# Patient Record
Sex: FEMALE | Race: BLACK OR AFRICAN AMERICAN | NOT HISPANIC OR LATINO | Employment: OTHER | URBAN - METROPOLITAN AREA
[De-identification: names, ages, dates, MRNs, and addresses within clinical notes are randomized per-mention and may not be internally consistent; named-entity substitution may affect disease eponyms.]

---

## 2017-01-12 ENCOUNTER — HOSPITAL ENCOUNTER (OUTPATIENT)
Dept: RADIOLOGY | Facility: HOSPITAL | Age: 82
Discharge: HOME | End: 2017-01-12
Attending: FAMILY MEDICINE
Payer: MEDICARE

## 2017-01-12 DIAGNOSIS — E04.1 NONTOXIC UNINODULAR GOITER: ICD-10-CM

## 2017-01-12 DIAGNOSIS — Z95.828 HISTORY OF REPAIR OF ANEURYSM OF ABDOMINAL AORTA USING ENDOVASCULAR STENT GRAFT: ICD-10-CM

## 2017-01-12 PROCEDURE — 76775 US EXAM ABDO BACK WALL LIM: CPT | Mod: TC

## 2017-01-12 PROCEDURE — 76775 US EXAM ABDO BACK WALL LIM: CPT | Mod: 26,,, | Performed by: RADIOLOGY

## 2017-01-12 PROCEDURE — 76536 US EXAM OF HEAD AND NECK: CPT | Mod: 26,,, | Performed by: RADIOLOGY

## 2017-01-12 PROCEDURE — 76536 US EXAM OF HEAD AND NECK: CPT | Mod: TC

## 2017-01-31 PROBLEM — R06.02 SHORTNESS OF BREATH: Status: ACTIVE | Noted: 2017-01-31

## 2017-01-31 PROBLEM — I35.1 AORTIC VALVE INSUFFICIENCY: Status: ACTIVE | Noted: 2017-01-31

## 2017-01-31 PROBLEM — I11.9 HYPERTENSIVE HEART DISEASE WITHOUT HEART FAILURE: Status: ACTIVE | Noted: 2017-01-31

## 2017-01-31 PROBLEM — I10 ESSENTIAL HYPERTENSION: Status: ACTIVE | Noted: 2017-01-31

## 2017-05-05 PROBLEM — I35.1 AORTIC VALVE INSUFFICIENCY: Status: RESOLVED | Noted: 2017-01-31 | Resolved: 2017-05-05

## 2017-05-16 ENCOUNTER — HOSPITAL ENCOUNTER (OUTPATIENT)
Dept: RADIOLOGY | Facility: HOSPITAL | Age: 82
Discharge: HOME | End: 2017-05-16
Attending: INTERNAL MEDICINE
Payer: MEDICARE

## 2017-05-16 DIAGNOSIS — I71.019 DISSECTION OF THORACIC AORTA: ICD-10-CM

## 2017-05-16 PROCEDURE — 71275 CT ANGIOGRAPHY CHEST: CPT | Mod: TC

## 2017-05-16 PROCEDURE — 74175 CTA ABDOMEN W/CONTRAST: CPT | Mod: 26,,, | Performed by: RADIOLOGY

## 2017-05-16 PROCEDURE — 74175 CTA ABDOMEN W/CONTRAST: CPT | Mod: TC

## 2017-05-16 PROCEDURE — 71275 CT ANGIOGRAPHY CHEST: CPT | Mod: 26,,, | Performed by: RADIOLOGY

## 2017-05-16 PROCEDURE — 25500020 PHARM REV CODE 255: Performed by: INTERNAL MEDICINE

## 2017-05-16 RX ADMIN — IOHEXOL 100 ML: 350 INJECTION, SOLUTION INTRAVENOUS at 02:05

## 2017-11-06 ENCOUNTER — OFFICE VISIT (OUTPATIENT)
Dept: CARDIOLOGY | Facility: CLINIC | Age: 82
End: 2017-11-06
Payer: MEDICARE

## 2017-11-06 VITALS
HEART RATE: 57 BPM | HEIGHT: 62 IN | WEIGHT: 182.5 LBS | DIASTOLIC BLOOD PRESSURE: 76 MMHG | BODY MASS INDEX: 33.58 KG/M2 | SYSTOLIC BLOOD PRESSURE: 110 MMHG

## 2017-11-06 DIAGNOSIS — Z86.79 HX OF REPAIR OF DISSECTING THORACIC ANEURYSM: Primary | ICD-10-CM

## 2017-11-06 DIAGNOSIS — I71.10 RUPTURED ANEURYSM OF THORACIC AORTA: ICD-10-CM

## 2017-11-06 DIAGNOSIS — I71.03: ICD-10-CM

## 2017-11-06 DIAGNOSIS — I10 HYPERTENSION COMPLICATIONS: ICD-10-CM

## 2017-11-06 DIAGNOSIS — I51.7 LVH (LEFT VENTRICULAR HYPERTROPHY): ICD-10-CM

## 2017-11-06 DIAGNOSIS — I10 ESSENTIAL HYPERTENSION: ICD-10-CM

## 2017-11-06 DIAGNOSIS — Z98.890 HX OF REPAIR OF DISSECTING THORACIC ANEURYSM: Primary | ICD-10-CM

## 2017-11-06 DIAGNOSIS — I11.9 HYPERTENSIVE HEART DISEASE WITHOUT HEART FAILURE: ICD-10-CM

## 2017-11-06 DIAGNOSIS — I71.019 DISSECTION OF THORACIC AORTA: ICD-10-CM

## 2017-11-06 PROCEDURE — 93000 ELECTROCARDIOGRAM COMPLETE: CPT | Mod: S$GLB,,, | Performed by: INTERNAL MEDICINE

## 2017-11-06 PROCEDURE — 99999 PR PBB SHADOW E&M-EST. PATIENT-LVL II: CPT | Mod: PBBFAC,,, | Performed by: INTERNAL MEDICINE

## 2017-11-06 PROCEDURE — 99213 OFFICE O/P EST LOW 20 MIN: CPT | Mod: S$GLB,,, | Performed by: INTERNAL MEDICINE

## 2017-11-06 RX ORDER — NIFEDIPINE 60 MG/1
60 TABLET, EXTENDED RELEASE ORAL DAILY
Qty: 90 TABLET | Refills: 3 | Status: SHIPPED | OUTPATIENT
Start: 2017-11-06 | End: 2018-12-24 | Stop reason: SDUPTHER

## 2017-11-06 NOTE — PROGRESS NOTES
"Subjective:   Patient ID:  Lisa Sepulveda is a 82 y.o. female     Chief Complaint   Patient presents with    Hypertension       HPI: Tires easily.  Uses walker when moving fast. "I don't have the energy I used to have."  Pt occasionally has some posterior neck pain.    Review of Systems   Cardiovascular: Negative for chest pain, claudication, dyspnea on exertion, irregular heartbeat, leg swelling, near-syncope, orthopnea, palpitations and syncope.     Note pt is only taking one-half of labetalol 300 mg bid  Objective:   Physical Exam   Constitutional: She is oriented to person, place, and time. She appears well-developed and well-nourished. No distress.   HENT:   Head: Normocephalic.   Eyes: No scleral icterus.   Neck: No JVD present.   Cardiovascular: Normal rate, regular rhythm and normal heart sounds.  Exam reveals no gallop and no friction rub.    No murmur heard.  Pulmonary/Chest: Effort normal and breath sounds normal. No stridor.   Musculoskeletal: She exhibits no edema.   Neurological: She is alert and oriented to person, place, and time.   Skin: Skin is warm and dry. She is not diaphoretic.   Psychiatric: She has a normal mood and affect. Her behavior is normal. Judgment and thought content normal.   Vitals reviewed.    ECG: sinus bradycardia, LVH with repolarization abnormality.    Note CTA earlier this year showed intact stent repair of thoracic aneurysm  Assessment:     1. Hx of repair of dissecting thoracic aneurysm    2. Ruptured aneurysm of thoracic aorta    3. Hypertension complications    4. LVH (left ventricular hypertrophy)    5. Dissecting aortic aneurysm (any part), thoracoabdominal    6. Dissection of thoracic aorta    7. Hypertensive heart disease without heart failure    8. Essential hypertension        Plan:   Continue same medical regimen.  Return to clinic in 6 months with ECG.    F/u with Dr Gibson  "

## 2018-04-06 ENCOUNTER — TELEPHONE (OUTPATIENT)
Dept: CARDIOLOGY | Facility: CLINIC | Age: 83
End: 2018-04-06

## 2018-05-07 ENCOUNTER — OFFICE VISIT (OUTPATIENT)
Dept: CARDIOLOGY | Facility: CLINIC | Age: 83
End: 2018-05-07
Payer: MEDICARE

## 2018-05-07 VITALS
HEIGHT: 62 IN | WEIGHT: 182.38 LBS | SYSTOLIC BLOOD PRESSURE: 111 MMHG | DIASTOLIC BLOOD PRESSURE: 71 MMHG | BODY MASS INDEX: 33.56 KG/M2 | HEART RATE: 80 BPM

## 2018-05-07 DIAGNOSIS — I10 ESSENTIAL HYPERTENSION: ICD-10-CM

## 2018-05-07 DIAGNOSIS — I10 HYPERTENSION: ICD-10-CM

## 2018-05-07 DIAGNOSIS — I71.019 DISSECTION OF THORACIC AORTA: ICD-10-CM

## 2018-05-07 DIAGNOSIS — I10 HYPERTENSION COMPLICATIONS: Primary | ICD-10-CM

## 2018-05-07 DIAGNOSIS — Z98.890 HX OF REPAIR OF DISSECTING THORACIC ANEURYSM: ICD-10-CM

## 2018-05-07 DIAGNOSIS — Z86.79 HX OF REPAIR OF DISSECTING THORACIC ANEURYSM: ICD-10-CM

## 2018-05-07 DIAGNOSIS — I11.9 HYPERTENSIVE HEART DISEASE WITHOUT HEART FAILURE: ICD-10-CM

## 2018-05-07 DIAGNOSIS — I51.7 LVH (LEFT VENTRICULAR HYPERTROPHY): ICD-10-CM

## 2018-05-07 DIAGNOSIS — I71.03: ICD-10-CM

## 2018-05-07 PROCEDURE — 3074F SYST BP LT 130 MM HG: CPT | Mod: CPTII,S$GLB,, | Performed by: INTERNAL MEDICINE

## 2018-05-07 PROCEDURE — 3078F DIAST BP <80 MM HG: CPT | Mod: CPTII,S$GLB,, | Performed by: INTERNAL MEDICINE

## 2018-05-07 PROCEDURE — 93000 ELECTROCARDIOGRAM COMPLETE: CPT | Mod: S$GLB,,, | Performed by: INTERNAL MEDICINE

## 2018-05-07 PROCEDURE — 99999 PR PBB SHADOW E&M-EST. PATIENT-LVL III: CPT | Mod: PBBFAC,,, | Performed by: INTERNAL MEDICINE

## 2018-05-07 PROCEDURE — 99213 OFFICE O/P EST LOW 20 MIN: CPT | Mod: S$GLB,,, | Performed by: INTERNAL MEDICINE

## 2018-05-07 RX ORDER — LABETALOL 100 MG/1
100 TABLET, FILM COATED ORAL
COMMUNITY
Start: 2018-02-02

## 2018-05-07 NOTE — PROGRESS NOTES
Subjective:      Patient ID: Lisa Sepulveda is a 82 y.o. female.    Chief Complaint: Follow-up (Hypertnsion)    HPI:   Limited to walking short distances.   Fatigue is much better since Dr Gibson reduced the dose of labetalol.     Review of Systems   Cardiovascular: Negative for chest pain, claudication, dyspnea on exertion, irregular heartbeat, leg swelling, near-syncope, orthopnea, palpitations and syncope.        Past Medical History:   Diagnosis Date    Arthritis     Hypertension         Past Surgical History:   Procedure Laterality Date    CORONARY ANGIOPLASTY WITH STENT PLACEMENT         Family History   Problem Relation Age of Onset    Hypertension Father     Stroke Father     Diabetes Sister     Diabetes Sister        Social History     Social History    Marital status: Single     Spouse name: N/A    Number of children: N/A    Years of education: N/A     Social History Main Topics    Smoking status: Never Smoker    Smokeless tobacco: Never Used    Alcohol use No    Drug use: No    Sexual activity: No     Other Topics Concern    None     Social History Narrative    None       Current Outpatient Prescriptions on File Prior to Visit   Medication Sig Dispense Refill    aspirin 81 MG Chew Take 81 mg by mouth once daily.      NIFEdipine (PROCARDIA-XL) 60 MG (OSM) 24 hr tablet Take 1 tablet (60 mg total) by mouth once daily. 90 tablet 3    SIMBRINZA 1-0.2 % DrpS       timolol maleate 0.5% (TIMOPTIC) 0.5 % Drop       [DISCONTINUED] labetalol (NORMODYNE) 300 MG tablet 150 mg every 12 (twelve) hours. 300 mg tablet---take a half in the morning and take a half tablet in the evening       No current facility-administered medications on file prior to visit.        Review of patient's allergies indicates:  No Known Allergies  Objective:     Vitals:    05/07/18 1327   BP: 111/71   BP Location: Left arm   Patient Position: Sitting   BP Method: Large (Automatic)   Pulse: 80   Weight: 82.7 kg (182 lb  "6.4 oz)   Height: 5' 2" (1.575 m)        Physical Exam   Constitutional: She is oriented to person, place, and time. She appears well-developed and well-nourished.   Eyes: No scleral icterus.   Neck: No JVD present. Carotid bruit is not present.   Cardiovascular: Normal rate and regular rhythm.  Exam reveals no gallop.    No murmur heard.  Pulmonary/Chest: Breath sounds normal.   Musculoskeletal: She exhibits no edema.   Neurological: She is alert and oriented to person, place, and time.   Skin: Skin is warm and dry.   Psychiatric: She has a normal mood and affect. Her behavior is normal. Judgment and thought content normal.   Vitals reviewed.     ECG: NSR, LVH, inverted T waves in inferior and lateral leads.(LVH with strain)   Assessment:   No diagnosis found.  Plan:   There are no diagnoses linked to this encounter.   Walk more  Wt reducing diet  Same meds  No Follow-up on file.  "

## 2018-05-30 ENCOUNTER — HOSPITAL ENCOUNTER (EMERGENCY)
Facility: HOSPITAL | Age: 83
Discharge: HOME | End: 2018-05-30
Attending: EMERGENCY MEDICINE
Payer: MEDICARE

## 2018-05-30 VITALS
SYSTOLIC BLOOD PRESSURE: 146 MMHG | TEMPERATURE: 99 F | RESPIRATION RATE: 19 BRPM | HEART RATE: 97 BPM | WEIGHT: 182 LBS | DIASTOLIC BLOOD PRESSURE: 86 MMHG | HEIGHT: 64 IN | OXYGEN SATURATION: 98 % | BODY MASS INDEX: 31.07 KG/M2

## 2018-05-30 DIAGNOSIS — R05.9 COUGH: ICD-10-CM

## 2018-05-30 DIAGNOSIS — J40 BRONCHITIS: Primary | ICD-10-CM

## 2018-05-30 LAB
ALBUMIN SERPL BCP-MCNC: 3.4 G/DL
ALP SERPL-CCNC: 88 U/L
ALT SERPL W/O P-5'-P-CCNC: 21 U/L
ANION GAP SERPL CALC-SCNC: 10 MMOL/L
AST SERPL-CCNC: 27 U/L
BASOPHILS # BLD AUTO: 0.05 K/UL
BASOPHILS NFR BLD: 0.6 %
BILIRUB SERPL-MCNC: 0.4 MG/DL
BNP SERPL-MCNC: 111 PG/ML
BUN SERPL-MCNC: 10 MG/DL
CALCIUM SERPL-MCNC: 9.4 MG/DL
CHLORIDE SERPL-SCNC: 103 MMOL/L
CO2 SERPL-SCNC: 26 MMOL/L
CREAT SERPL-MCNC: 0.9 MG/DL
DIFFERENTIAL METHOD: ABNORMAL
EOSINOPHIL # BLD AUTO: 0.3 K/UL
EOSINOPHIL NFR BLD: 3.6 %
ERYTHROCYTE [DISTWIDTH] IN BLOOD BY AUTOMATED COUNT: 14.8 %
EST. GFR  (AFRICAN AMERICAN): >60 ML/MIN/1.73 M^2
EST. GFR  (NON AFRICAN AMERICAN): >60 ML/MIN/1.73 M^2
GLUCOSE SERPL-MCNC: 95 MG/DL
HCT VFR BLD AUTO: 41.5 %
HGB BLD-MCNC: 13.5 G/DL
INR PPP: 1.1
LYMPHOCYTES # BLD AUTO: 1.7 K/UL
LYMPHOCYTES NFR BLD: 22 %
MCH RBC QN AUTO: 28.7 PG
MCHC RBC AUTO-ENTMCNC: 32.5 G/DL
MCV RBC AUTO: 88 FL
MONOCYTES # BLD AUTO: 0.8 K/UL
MONOCYTES NFR BLD: 9.8 %
NEUTROPHILS # BLD AUTO: 5 K/UL
NEUTROPHILS NFR BLD: 63.7 %
PLATELET # BLD AUTO: 274 K/UL
PMV BLD AUTO: 11.7 FL
POTASSIUM SERPL-SCNC: 3.4 MMOL/L
PROT SERPL-MCNC: 7.8 G/DL
PROTHROMBIN TIME: 11.1 SEC
RBC # BLD AUTO: 4.7 M/UL
SODIUM SERPL-SCNC: 139 MMOL/L
TROPONIN I SERPL DL<=0.01 NG/ML-MCNC: <0.006 NG/ML
WBC # BLD AUTO: 7.79 K/UL

## 2018-05-30 PROCEDURE — 25000242 PHARM REV CODE 250 ALT 637 W/ HCPCS: Performed by: EMERGENCY MEDICINE

## 2018-05-30 PROCEDURE — 83880 ASSAY OF NATRIURETIC PEPTIDE: CPT

## 2018-05-30 PROCEDURE — 85025 COMPLETE CBC W/AUTO DIFF WBC: CPT

## 2018-05-30 PROCEDURE — 93010 ELECTROCARDIOGRAM REPORT: CPT | Mod: ,,, | Performed by: INTERNAL MEDICINE

## 2018-05-30 PROCEDURE — 84484 ASSAY OF TROPONIN QUANT: CPT

## 2018-05-30 PROCEDURE — 99284 EMERGENCY DEPT VISIT MOD MDM: CPT | Mod: 25

## 2018-05-30 PROCEDURE — 93005 ELECTROCARDIOGRAM TRACING: CPT

## 2018-05-30 PROCEDURE — 85610 PROTHROMBIN TIME: CPT

## 2018-05-30 PROCEDURE — 80053 COMPREHEN METABOLIC PANEL: CPT

## 2018-05-30 PROCEDURE — 94640 AIRWAY INHALATION TREATMENT: CPT

## 2018-05-30 RX ORDER — IPRATROPIUM BROMIDE AND ALBUTEROL SULFATE 2.5; .5 MG/3ML; MG/3ML
3 SOLUTION RESPIRATORY (INHALATION)
Status: COMPLETED | OUTPATIENT
Start: 2018-05-30 | End: 2018-05-30

## 2018-05-30 RX ORDER — BENZONATATE 100 MG/1
100 CAPSULE ORAL 3 TIMES DAILY PRN
Qty: 20 CAPSULE | Refills: 0 | Status: SHIPPED | OUTPATIENT
Start: 2018-05-30 | End: 2018-06-09

## 2018-05-30 RX ORDER — ALBUTEROL SULFATE 90 UG/1
1-2 AEROSOL, METERED RESPIRATORY (INHALATION) EVERY 6 HOURS PRN
Qty: 1 INHALER | Refills: 0 | Status: SHIPPED | OUTPATIENT
Start: 2018-05-30 | End: 2021-09-28 | Stop reason: SDUPTHER

## 2018-05-30 RX ADMIN — IPRATROPIUM BROMIDE AND ALBUTEROL SULFATE 3 ML: .5; 3 SOLUTION RESPIRATORY (INHALATION) at 07:05

## 2018-05-31 ENCOUNTER — PES CALL (OUTPATIENT)
Dept: ADMINISTRATIVE | Facility: CLINIC | Age: 83
End: 2018-05-31

## 2018-05-31 NOTE — ED PROVIDER NOTES
Encounter Date: 5/30/2018    SCRIBE #1 NOTE: I, Carlos Jefferson, am scribing for, and in the presence of, Dr. Osborn.       History     Chief Complaint   Patient presents with    Cough     c/o cough for the past few days. Also c/o pain across her abdomen, only when she coughs. Has a history of aortic aneurysm     Lisa Sepulveda is a 82 y.o. female who  has a past medical history of Aortic aneurysm; Arthritis; and Hypertension.    The patient presents to the ED due to a cough for 3 days. She reports diffuse abdominal pain secondary to her cough. Patient denies fever. She reports hx aortic aneurysm 4 years ago.      The history is provided by the patient.     Review of patient's allergies indicates:  No Known Allergies  Past Medical History:   Diagnosis Date    Aortic aneurysm     Arthritis     Hypertension      Past Surgical History:   Procedure Laterality Date    ABDOMINAL AORTIC ANEURYSM REPAIR      ABDOMINAL SURGERY      CORONARY ANGIOPLASTY WITH STENT PLACEMENT       Family History   Problem Relation Age of Onset    Hypertension Father     Stroke Father     Diabetes Sister     Diabetes Sister      Social History   Substance Use Topics    Smoking status: Never Smoker    Smokeless tobacco: Never Used    Alcohol use No     Review of Systems   Constitutional: Negative for fever.   Respiratory: Positive for cough.    Gastrointestinal: Positive for abdominal pain.   All other systems reviewed and are negative.      Physical Exam     Initial Vitals [05/30/18 1717]   BP Pulse Resp Temp SpO2   105/61 94 20 98.4 °F (36.9 °C) 95 %      MAP       75.67         Physical Exam    Nursing note and vitals reviewed.  Constitutional: She appears well-developed and well-nourished.   HENT:   Head: Normocephalic and atraumatic.   Eyes: Conjunctivae and EOM are normal. Pupils are equal, round, and reactive to light.   Neck: Normal range of motion. Neck supple.   Cardiovascular: Normal rate.   Pulmonary/Chest: No respiratory  distress. She has wheezes.   Diffuse wheezing   Musculoskeletal: Normal range of motion.   Neurological: She is alert and oriented to person, place, and time.   Skin: Skin is warm and dry.   Psychiatric: She has a normal mood and affect.         ED Course   Procedures  Labs Reviewed   CBC W/ AUTO DIFFERENTIAL - Abnormal; Notable for the following:        Result Value    RDW 14.8 (*)     All other components within normal limits   COMPREHENSIVE METABOLIC PANEL - Abnormal; Notable for the following:     Potassium 3.4 (*)     Albumin 3.4 (*)     All other components within normal limits   B-TYPE NATRIURETIC PEPTIDE - Abnormal; Notable for the following:      (*)     All other components within normal limits   TROPONIN I   PROTIME-INR   URINALYSIS        Imaging Results          X-Ray Chest PA And Lateral (Final result)  Result time 05/30/18 17:56:22    Final result by Syed Olmos MD (05/30/18 17:56:22)                 Impression:      1. Grossly stable chronic findings, no acute cardiopulmonary process.      Electronically signed by: Syed Olmos MD  Date:    05/30/2018  Time:    17:56             Narrative:    EXAMINATION:  XR CHEST PA AND LATERAL    CLINICAL HISTORY:  Cough    TECHNIQUE:  PA and lateral views of the chest were performed.    COMPARISON:  CT 05/16/2017, radiograph 10/14/2016    FINDINGS:  The cardiomediastinal silhouette is not enlarged, noting grossly stable positioning of aortic stent graft.  Please note, patient's chin obscures views of the lung apices, particularly the left.  There is stable elevation of the hemidiaphragms...  There is no pleural effusion.  The trachea is midline.  The lungs are symmetrically expanded bilaterally with mildly coarse interstitial attenuation.  No large focal consolidation seen.  There is no pneumothorax.  The osseous structures are remarkable for degenerative changes..                                   Medical Decision Making:   ED Management:  Pt  feels better after breathing treatment.  No indication for abx.  Will dc w albuterol and cough suppressant              Attending Attestation:           Physician Attestation for Scribe:  Physician Attestation Statement for Scribe #1: I, Cristofer Osborn, reviewed documentation, as scribed by Carlos martins in my presence, and it is both accurate and complete.                    Clinical Impression:   The primary encounter diagnosis was Bronchitis. A diagnosis of Cough was also pertinent to this visit.    Disposition:   Disposition: Discharged  Condition: Stable                        Cristofer Osborn MD  05/30/18 2026

## 2018-11-06 ENCOUNTER — OFFICE VISIT (OUTPATIENT)
Dept: CARDIOLOGY | Facility: CLINIC | Age: 83
End: 2018-11-06
Payer: MEDICARE

## 2018-11-06 VITALS
BODY MASS INDEX: 31.02 KG/M2 | OXYGEN SATURATION: 95 % | SYSTOLIC BLOOD PRESSURE: 105 MMHG | DIASTOLIC BLOOD PRESSURE: 68 MMHG | HEIGHT: 64 IN | HEART RATE: 66 BPM | WEIGHT: 181.69 LBS

## 2018-11-06 DIAGNOSIS — I51.7 LVH (LEFT VENTRICULAR HYPERTROPHY): ICD-10-CM

## 2018-11-06 DIAGNOSIS — Z98.890 HX OF REPAIR OF DISSECTING THORACIC ANEURYSM: ICD-10-CM

## 2018-11-06 DIAGNOSIS — I10 HYPERTENSION COMPLICATIONS: Primary | ICD-10-CM

## 2018-11-06 DIAGNOSIS — I10 ESSENTIAL HYPERTENSION: ICD-10-CM

## 2018-11-06 DIAGNOSIS — I71.03: ICD-10-CM

## 2018-11-06 DIAGNOSIS — Z86.79 HX OF REPAIR OF DISSECTING THORACIC ANEURYSM: ICD-10-CM

## 2018-11-06 DIAGNOSIS — I11.9 HYPERTENSIVE HEART DISEASE WITHOUT HEART FAILURE: ICD-10-CM

## 2018-11-06 PROCEDURE — 3078F DIAST BP <80 MM HG: CPT | Mod: CPTII,S$GLB,, | Performed by: INTERNAL MEDICINE

## 2018-11-06 PROCEDURE — 99213 OFFICE O/P EST LOW 20 MIN: CPT | Mod: S$GLB,,, | Performed by: INTERNAL MEDICINE

## 2018-11-06 PROCEDURE — 99999 PR PBB SHADOW E&M-EST. PATIENT-LVL III: CPT | Mod: PBBFAC,,, | Performed by: INTERNAL MEDICINE

## 2018-11-06 PROCEDURE — 93000 ELECTROCARDIOGRAM COMPLETE: CPT | Mod: S$GLB,,, | Performed by: INTERNAL MEDICINE

## 2018-11-06 PROCEDURE — 3074F SYST BP LT 130 MM HG: CPT | Mod: CPTII,S$GLB,, | Performed by: INTERNAL MEDICINE

## 2018-11-06 PROCEDURE — 1101F PT FALLS ASSESS-DOCD LE1/YR: CPT | Mod: CPTII,S$GLB,, | Performed by: INTERNAL MEDICINE

## 2018-11-06 NOTE — PROGRESS NOTES
"  Subjective:      Patient ID: Lisa Sepulveda is a 83 y.o. female.    Chief Complaint: Follow-up (Hypertension)    HPI:  Feels stiff at times    Walks a little.    "My sinuses act up."  Sometimes it is hard to breathe through my nose.    Review of Systems   Cardiovascular: Positive for leg swelling (minor, intermittent). Negative for chest pain, claudication, dyspnea on exertion, irregular heartbeat, near-syncope, orthopnea, palpitations and syncope.        Past Medical History:   Diagnosis Date    Aortic aneurysm     Arthritis     Hypertension         Past Surgical History:   Procedure Laterality Date    ABDOMINAL AORTIC ANEURYSM REPAIR      ABDOMINAL SURGERY      CORONARY ANGIOPLASTY WITH STENT PLACEMENT      REPAIR, ANEURYSM, ENDOVASCULAR GRAFT, AORTA, THORACIC Right 3/5/2014    Performed by Yang Kennedy MD at [facility] OR       Family History   Problem Relation Age of Onset    Hypertension Father     Stroke Father     Diabetes Sister     Diabetes Sister        Social History     Socioeconomic History    Marital status: Single     Spouse name: None    Number of children: None    Years of education: None    Highest education level: None   Social Needs    Financial resource strain: None    Food insecurity - worry: None    Food insecurity - inability: None    Transportation needs - medical: None    Transportation needs - non-medical: None   Occupational History    None   Tobacco Use    Smoking status: Never Smoker    Smokeless tobacco: Never Used   Substance and Sexual Activity    Alcohol use: No    Drug use: No    Sexual activity: No   Other Topics Concern    None   Social History Narrative    None       Current Outpatient Medications on File Prior to Visit   Medication Sig Dispense Refill    albuterol 90 mcg/actuation inhaler Inhale 1-2 puffs into the lungs every 6 (six) hours as needed for Wheezing. Rescue 1 Inhaler 0    aspirin 81 MG Chew Take 81 mg by mouth once daily.      " "labetalol (NORMODYNE) 100 MG tablet Take 100 mg by mouth. Take 1/2 tablet twice daily      NIFEdipine (PROCARDIA-XL) 60 MG (OSM) 24 hr tablet Take 1 tablet (60 mg total) by mouth once daily. 90 tablet 3    SIMBRINZA 1-0.2 % DrpS       timolol maleate 0.5% (TIMOPTIC) 0.5 % Drop        No current facility-administered medications on file prior to visit.        Review of patient's allergies indicates:  No Known Allergies  Objective:     Vitals:    11/06/18 1418   BP: 105/68   BP Location: Right arm   Patient Position: Sitting   BP Method: Large (Automatic)   Pulse: 66   SpO2: 95%   Weight: 82.4 kg (181 lb 11.2 oz)   Height: 5' 4" (1.626 m)        Physical Exam   Constitutional: She is oriented to person, place, and time. She appears well-developed and well-nourished.   Eyes: No scleral icterus.   Neck: No JVD present. Carotid bruit is not present.   Cardiovascular: Normal rate and regular rhythm. Exam reveals no gallop.   No murmur heard.  Pulmonary/Chest: Breath sounds normal.   Musculoskeletal: She exhibits no edema.   Neurological: She is alert and oriented to person, place, and time.   Skin: Skin is warm and dry.   Psychiatric: She has a normal mood and affect. Her behavior is normal. Judgment and thought content normal.   Vitals reviewed.     ECG: NSR, anterolateral T wave inversions, similar to old tracing    Note last labs from May 2018  Assessment:     1. Hypertension complications    2. LVH (left ventricular hypertrophy)    3. Dissecting aortic aneurysm (any part), thoracoabdominal    4. Hypertensive heart disease without heart failure    5. Essential hypertension    6. Hx of repair of dissecting thoracic aneurysm      Plan:   Lisa was seen today for follow-up.    Diagnoses and all orders for this visit:    Hypertension complications    LVH (left ventricular hypertrophy)    Dissecting aortic aneurysm (any part), thoracoabdominal    Hypertensive heart disease without heart failure    Essential " hypertension  -     IN OFFICE EKG 12-LEAD (to [facility])    Hx of repair of dissecting thoracic aneurysm       Fluticasone nasal spray daily for sinus issues  Prn Claritin  Avoid decongestants    Rest of meds the same    F/u with Dr Gibson who does labs    Follow-up in about 6 months (around 5/6/2019).

## 2018-12-24 RX ORDER — NIFEDIPINE 60 MG/1
TABLET, EXTENDED RELEASE ORAL
Qty: 90 TABLET | Refills: 3 | Status: SHIPPED | OUTPATIENT
Start: 2018-12-24

## 2019-03-19 DIAGNOSIS — Z98.890 HISTORY OF AAA (ABDOMINAL AORTIC ANEURYSM) REPAIR: Primary | ICD-10-CM

## 2019-04-16 ENCOUNTER — HOSPITAL ENCOUNTER (OUTPATIENT)
Dept: RADIOLOGY | Facility: HOSPITAL | Age: 84
Discharge: HOME | End: 2019-04-16
Attending: FAMILY MEDICINE
Payer: MEDICARE

## 2019-04-16 DIAGNOSIS — Z98.890 HISTORY OF AAA (ABDOMINAL AORTIC ANEURYSM) REPAIR: ICD-10-CM

## 2019-04-16 PROCEDURE — 76775 US EXAM ABDO BACK WALL LIM: CPT | Mod: 26,,, | Performed by: RADIOLOGY

## 2019-04-16 PROCEDURE — 76775 US ABDOMINAL AORTA: ICD-10-PCS | Mod: 26,,, | Performed by: RADIOLOGY

## 2019-04-16 PROCEDURE — 76775 US EXAM ABDO BACK WALL LIM: CPT | Mod: TC

## 2019-05-09 ENCOUNTER — OFFICE VISIT (OUTPATIENT)
Dept: CARDIOLOGY | Facility: CLINIC | Age: 84
End: 2019-05-09
Payer: MEDICARE

## 2019-05-09 VITALS
BODY MASS INDEX: 30.78 KG/M2 | DIASTOLIC BLOOD PRESSURE: 73 MMHG | WEIGHT: 180.31 LBS | HEIGHT: 64 IN | SYSTOLIC BLOOD PRESSURE: 117 MMHG | HEART RATE: 73 BPM

## 2019-05-09 DIAGNOSIS — I51.7 LVH (LEFT VENTRICULAR HYPERTROPHY): ICD-10-CM

## 2019-05-09 DIAGNOSIS — I11.9 HYPERTENSIVE HEART DISEASE WITHOUT HEART FAILURE: ICD-10-CM

## 2019-05-09 DIAGNOSIS — I10 HYPERTENSION COMPLICATIONS: ICD-10-CM

## 2019-05-09 DIAGNOSIS — Z98.890 HX OF REPAIR OF DISSECTING THORACIC ANEURYSM: Primary | ICD-10-CM

## 2019-05-09 DIAGNOSIS — Z86.79 HX OF REPAIR OF DISSECTING THORACIC ANEURYSM: Primary | ICD-10-CM

## 2019-05-09 DIAGNOSIS — I10 ESSENTIAL HYPERTENSION: ICD-10-CM

## 2019-05-09 PROCEDURE — 99214 OFFICE O/P EST MOD 30 MIN: CPT | Mod: S$GLB,,, | Performed by: INTERNAL MEDICINE

## 2019-05-09 PROCEDURE — 93000 ELECTROCARDIOGRAM COMPLETE: CPT | Mod: S$GLB,,, | Performed by: INTERNAL MEDICINE

## 2019-05-09 PROCEDURE — 1101F PT FALLS ASSESS-DOCD LE1/YR: CPT | Mod: CPTII,S$GLB,, | Performed by: INTERNAL MEDICINE

## 2019-05-09 PROCEDURE — 3074F PR MOST RECENT SYSTOLIC BLOOD PRESSURE < 130 MM HG: ICD-10-PCS | Mod: CPTII,S$GLB,, | Performed by: INTERNAL MEDICINE

## 2019-05-09 PROCEDURE — 99214 PR OFFICE/OUTPT VISIT, EST, LEVL IV, 30-39 MIN: ICD-10-PCS | Mod: S$GLB,,, | Performed by: INTERNAL MEDICINE

## 2019-05-09 PROCEDURE — 3074F SYST BP LT 130 MM HG: CPT | Mod: CPTII,S$GLB,, | Performed by: INTERNAL MEDICINE

## 2019-05-09 PROCEDURE — 3078F PR MOST RECENT DIASTOLIC BLOOD PRESSURE < 80 MM HG: ICD-10-PCS | Mod: CPTII,S$GLB,, | Performed by: INTERNAL MEDICINE

## 2019-05-09 PROCEDURE — 1101F PR PT FALLS ASSESS DOC 0-1 FALLS W/OUT INJ PAST YR: ICD-10-PCS | Mod: CPTII,S$GLB,, | Performed by: INTERNAL MEDICINE

## 2019-05-09 PROCEDURE — 93000 EKG 12-LEAD: ICD-10-PCS | Mod: S$GLB,,, | Performed by: INTERNAL MEDICINE

## 2019-05-09 PROCEDURE — 99999 PR PBB SHADOW E&M-EST. PATIENT-LVL II: ICD-10-PCS | Mod: PBBFAC,,, | Performed by: INTERNAL MEDICINE

## 2019-05-09 PROCEDURE — 3078F DIAST BP <80 MM HG: CPT | Mod: CPTII,S$GLB,, | Performed by: INTERNAL MEDICINE

## 2019-05-09 PROCEDURE — 99999 PR PBB SHADOW E&M-EST. PATIENT-LVL II: CPT | Mod: PBBFAC,,, | Performed by: INTERNAL MEDICINE

## 2019-05-09 NOTE — PROGRESS NOTES
Subjective:      Patient ID: Lisa Sepulveda is a 83 y.o. female.    Chief Complaint: Follow-up (Hypertension)    HPI:  Occasional stiffness in the back eased with ASA.  Shops a little.  Limited by knee pain.    Review of Systems   Cardiovascular: Positive for leg swelling (mild, intermittent). Negative for chest pain, claudication, dyspnea on exertion, irregular heartbeat, near-syncope, orthopnea, palpitations and syncope.        Past Medical History:   Diagnosis Date    Aortic aneurysm     Arthritis     Hypertension         Past Surgical History:   Procedure Laterality Date    ABDOMINAL AORTIC ANEURYSM REPAIR      ABDOMINAL SURGERY      CORONARY ANGIOPLASTY WITH STENT PLACEMENT      REPAIR, ANEURYSM, ENDOVASCULAR GRAFT, AORTA, THORACIC Right 3/5/2014    Performed by [facility] Kennedy MD at [facility] OR       Family History   Problem Relation Age of Onset    Hypertension Father     Stroke Father     Diabetes Sister     Diabetes Sister        Social History     Socioeconomic History    Marital status: Single     Spouse name: Not on file    Number of children: Not on file    Years of education: Not on file    Highest education level: Not on file   Occupational History    Not on file   Social Needs    Financial resource strain: Not on file    Food insecurity:     Worry: Not on file     Inability: Not on file    Transportation needs:     Medical: Not on file     Non-medical: Not on file   Tobacco Use    Smoking status: Never Smoker    Smokeless tobacco: Never Used   Substance and Sexual Activity    Alcohol use: No    Drug use: No    Sexual activity: Never   Lifestyle    Physical activity:     Days per week: Not on file     Minutes per session: Not on file    Stress: Not on file   Relationships    Social connections:     Talks on phone: Not on file     Gets together: Not on file     Attends Hinduism service: Not on file     Active member of club or organization: Not on file     Attends meetings  "of clubs or organizations: Not on file     Relationship status: Not on file   Other Topics Concern    Not on file   Social History Narrative    Not on file       Current Outpatient Medications on File Prior to Visit   Medication Sig Dispense Refill    albuterol 90 mcg/actuation inhaler Inhale 1-2 puffs into the lungs every 6 (six) hours as needed for Wheezing. Rescue 1 Inhaler 0    aspirin 81 MG Chew Take 81 mg by mouth once daily.      labetalol (NORMODYNE) 100 MG tablet Take 100 mg by mouth. Take 1/2 tablet twice daily      NIFEdipine (PROCARDIA-XL) 60 MG (OSM) 24 hr tablet TAKE ONE TABLET BY MOUTH ONCE DAILY 90 tablet 3    SIMBRINZA 1-0.2 % DrpS       timolol maleate 0.5% (TIMOPTIC) 0.5 % Drop        No current facility-administered medications on file prior to visit.        Review of patient's allergies indicates:  No Known Allergies  Objective:     Vitals:    05/09/19 1318   BP: 117/73   BP Location: Left arm   Patient Position: Sitting   BP Method: Large (Automatic)   Pulse: 73   Weight: 81.8 kg (180 lb 5.4 oz)   Height: 5' 4" (1.626 m)        Physical Exam   Constitutional: She is oriented to person, place, and time. She appears well-developed and well-nourished.   Eyes: No scleral icterus.   Neck: No JVD present. Carotid bruit is not present.   Cardiovascular: Normal rate and regular rhythm. Exam reveals no gallop.   No murmur heard.  Pulmonary/Chest: Breath sounds normal.   Musculoskeletal: She exhibits no edema.   Neurological: She is alert and oriented to person, place, and time.   Skin: Skin is warm and dry.   Psychiatric: She has a normal mood and affect. Her behavior is normal. Judgment and thought content normal.   Vitals reviewed.     Note CTA of chest in 2017 showed stents in good position in thoracic aorta    Abd. U/S last month showed no AAA    ECG today: NSR, inverted T waves in inferior and anterolateral leads, LVH.  Unchanged.  Assessment:     1. Hx of repair of dissecting thoracic " aneurysm    2. Essential hypertension    3. Hypertensive heart disease without heart failure    4. LVH (left ventricular hypertrophy)    5. Hypertension complications      Plan:   Lisa was seen today for follow-up.    Diagnoses and all orders for this visit:    Hx of repair of dissecting thoracic aneurysm    Essential hypertension  -     IN OFFICE EKG 12-LEAD (to [place])    Hypertensive heart disease without heart failure    LVH (left ventricular hypertrophy)    Hypertension complications     Same meds    RTC 6 months    Get labs as ordered by Dr Gibson    Follow up in about 6 months (around 11/9/2019).

## 2019-11-14 ENCOUNTER — OFFICE VISIT (OUTPATIENT)
Dept: CARDIOLOGY | Facility: CLINIC | Age: 84
End: 2019-11-14
Payer: MEDICARE

## 2019-11-14 VITALS
DIASTOLIC BLOOD PRESSURE: 74 MMHG | HEART RATE: 83 BPM | HEIGHT: 64 IN | WEIGHT: 180.69 LBS | SYSTOLIC BLOOD PRESSURE: 111 MMHG | BODY MASS INDEX: 30.85 KG/M2 | OXYGEN SATURATION: 98 %

## 2019-11-14 DIAGNOSIS — I10 ESSENTIAL HYPERTENSION: ICD-10-CM

## 2019-11-14 DIAGNOSIS — I11.9 HYPERTENSIVE HEART DISEASE WITHOUT HEART FAILURE: ICD-10-CM

## 2019-11-14 DIAGNOSIS — Z98.890 HX OF REPAIR OF DISSECTING THORACIC ANEURYSM: Primary | ICD-10-CM

## 2019-11-14 DIAGNOSIS — Z86.79 HX OF REPAIR OF DISSECTING THORACIC ANEURYSM: Primary | ICD-10-CM

## 2019-11-14 PROCEDURE — 99213 PR OFFICE/OUTPT VISIT, EST, LEVL III, 20-29 MIN: ICD-10-PCS | Mod: 25,S$GLB,, | Performed by: INTERNAL MEDICINE

## 2019-11-14 PROCEDURE — 3078F DIAST BP <80 MM HG: CPT | Mod: CPTII,S$GLB,, | Performed by: INTERNAL MEDICINE

## 2019-11-14 PROCEDURE — 99999 PR PBB SHADOW E&M-EST. PATIENT-LVL III: CPT | Mod: PBBFAC,,, | Performed by: INTERNAL MEDICINE

## 2019-11-14 PROCEDURE — 3074F PR MOST RECENT SYSTOLIC BLOOD PRESSURE < 130 MM HG: ICD-10-PCS | Mod: CPTII,S$GLB,, | Performed by: INTERNAL MEDICINE

## 2019-11-14 PROCEDURE — 93000 EKG 12-LEAD: ICD-10-PCS | Mod: S$GLB,,, | Performed by: INTERNAL MEDICINE

## 2019-11-14 PROCEDURE — 3078F PR MOST RECENT DIASTOLIC BLOOD PRESSURE < 80 MM HG: ICD-10-PCS | Mod: CPTII,S$GLB,, | Performed by: INTERNAL MEDICINE

## 2019-11-14 PROCEDURE — 1101F PT FALLS ASSESS-DOCD LE1/YR: CPT | Mod: CPTII,S$GLB,, | Performed by: INTERNAL MEDICINE

## 2019-11-14 PROCEDURE — 99213 OFFICE O/P EST LOW 20 MIN: CPT | Mod: 25,S$GLB,, | Performed by: INTERNAL MEDICINE

## 2019-11-14 PROCEDURE — 93000 ELECTROCARDIOGRAM COMPLETE: CPT | Mod: S$GLB,,, | Performed by: INTERNAL MEDICINE

## 2019-11-14 PROCEDURE — 1101F PR PT FALLS ASSESS DOC 0-1 FALLS W/OUT INJ PAST YR: ICD-10-PCS | Mod: CPTII,S$GLB,, | Performed by: INTERNAL MEDICINE

## 2019-11-14 PROCEDURE — 99999 PR PBB SHADOW E&M-EST. PATIENT-LVL III: ICD-10-PCS | Mod: PBBFAC,,, | Performed by: INTERNAL MEDICINE

## 2019-11-14 PROCEDURE — 3074F SYST BP LT 130 MM HG: CPT | Mod: CPTII,S$GLB,, | Performed by: INTERNAL MEDICINE

## 2019-11-14 NOTE — PROGRESS NOTES
Subjective:      Patient ID: Lisa Sepulveda is a 84 y.o. female.    Chief Complaint: Follow-up (Chest heaviness/SOB - using inhaler helps)    HPI:   Pt c/o uncomfortable feeling in the stomach when walking a lot.    Pt walks in house.  Pt washes dishes on occasion.    Pt may shop a little.    Pt gets tired easily.    No chest pain    Review of Systems   Cardiovascular: Positive for leg swelling (Feet swell a little on occasion). Negative for chest pain, claudication, dyspnea on exertion, irregular heartbeat, near-syncope, orthopnea, palpitations and syncope.        Past Medical History:   Diagnosis Date    Aortic aneurysm     Arthritis     Hypertension         Past Surgical History:   Procedure Laterality Date    ABDOMINAL AORTIC ANEURYSM REPAIR      ABDOMINAL SURGERY         Family History   Problem Relation Age of Onset    Hypertension Father     Stroke Father     Diabetes Sister     Diabetes Sister        Social History     Socioeconomic History    Marital status: Single     Spouse name: Not on file    Number of children: Not on file    Years of education: Not on file    Highest education level: Not on file   Occupational History    Not on file   Social Needs    Financial resource strain: Not on file    Food insecurity:     Worry: Not on file     Inability: Not on file    Transportation needs:     Medical: Not on file     Non-medical: Not on file   Tobacco Use    Smoking status: Never Smoker    Smokeless tobacco: Never Used   Substance and Sexual Activity    Alcohol use: No    Drug use: No    Sexual activity: Never   Lifestyle    Physical activity:     Days per week: Not on file     Minutes per session: Not on file    Stress: Not on file   Relationships    Social connections:     Talks on phone: Not on file     Gets together: Not on file     Attends Gnosticism service: Not on file     Active member of club or organization: Not on file     Attends meetings of clubs or organizations: Not  "on file     Relationship status: Not on file   Other Topics Concern    Not on file   Social History Narrative    Not on file       Current Outpatient Medications on File Prior to Visit   Medication Sig Dispense Refill    albuterol 90 mcg/actuation inhaler Inhale 1-2 puffs into the lungs every 6 (six) hours as needed for Wheezing. Rescue 1 Inhaler 0    aspirin 81 MG Chew Take 81 mg by mouth once daily.      labetalol (NORMODYNE) 100 MG tablet Take 100 mg by mouth. Take 1/2 tablet twice daily      NIFEdipine (PROCARDIA-XL) 60 MG (OSM) 24 hr tablet TAKE ONE TABLET BY MOUTH ONCE DAILY 90 tablet 3    SIMBRINZA 1-0.2 % DrpS       timolol maleate 0.5% (TIMOPTIC) 0.5 % Drop        No current facility-administered medications on file prior to visit.        Review of patient's allergies indicates:  No Known Allergies  Objective:     Vitals:    11/14/19 1447   BP: 111/74   BP Location: Left arm   Patient Position: Sitting   BP Method: Large (Automatic)   Pulse: 83   SpO2: 98%   Weight: 81.9 kg (180 lb 10.7 oz)   Height: 5' 4" (1.626 m)        Physical Exam   Constitutional: She is oriented to person, place, and time. She appears well-developed and well-nourished.   Eyes: No scleral icterus.   Neck: No JVD present. Carotid bruit is not present.   Cardiovascular: Normal rate and regular rhythm. Exam reveals no gallop.   No murmur heard.  Pulses:       Dorsalis pedis pulses are 2+ on the right side, and 2+ on the left side.   Pulmonary/Chest: Breath sounds normal.   Abdominal: Soft. She exhibits no abdominal bruit, no pulsatile midline mass and no mass. There is no hepatosplenomegaly. There is no tenderness.   Musculoskeletal: She exhibits no edema.   Neurological: She is alert and oriented to person, place, and time.   Skin: Skin is warm and dry.   Psychiatric: She has a normal mood and affect. Her behavior is normal. Judgment and thought content normal.   Vitals reviewed.     ECG-NSR, inverted T waves in inferior and " anterolateral leads.  Unchanged    Assessment:     1. Hx of repair of dissecting thoracic aneurysm    2. Essential hypertension    3. Hypertensive heart disease without heart failure      Plan:   Lisa was seen today for follow-up.    Diagnoses and all orders for this visit:    Hx of repair of dissecting thoracic aneurysm    Essential hypertension  -     IN OFFICE EKG 12-LEAD (to [place])    Hypertensive heart disease without heart failure  -     IN OFFICE EKG 12-LEAD (to [facility])     Same meds    F/.u with gyn    F/u with Dr Sarah Gibson who does labs    RTC 6 months    Follow up in about 6 months (around 5/14/2020).

## 2020-05-25 ENCOUNTER — OFFICE VISIT (OUTPATIENT)
Dept: CARDIOLOGY | Facility: CLINIC | Age: 85
End: 2020-05-25
Payer: MEDICARE

## 2020-05-25 VITALS
BODY MASS INDEX: 29.49 KG/M2 | HEIGHT: 64 IN | DIASTOLIC BLOOD PRESSURE: 77 MMHG | HEART RATE: 81 BPM | WEIGHT: 172.75 LBS | SYSTOLIC BLOOD PRESSURE: 125 MMHG

## 2020-05-25 DIAGNOSIS — I11.9 HYPERTENSIVE HEART DISEASE WITHOUT HEART FAILURE: ICD-10-CM

## 2020-05-25 DIAGNOSIS — I10 ESSENTIAL HYPERTENSION: ICD-10-CM

## 2020-05-25 DIAGNOSIS — Z86.79 HX OF REPAIR OF DISSECTING THORACIC ANEURYSM: Primary | ICD-10-CM

## 2020-05-25 DIAGNOSIS — Z98.890 HX OF REPAIR OF DISSECTING THORACIC ANEURYSM: Primary | ICD-10-CM

## 2020-05-25 DIAGNOSIS — I10 HYPERTENSION COMPLICATIONS: ICD-10-CM

## 2020-05-25 PROCEDURE — 1159F PR MEDICATION LIST DOCUMENTED IN MEDICAL RECORD: ICD-10-PCS | Mod: S$GLB,,, | Performed by: INTERNAL MEDICINE

## 2020-05-25 PROCEDURE — 3078F DIAST BP <80 MM HG: CPT | Mod: CPTII,S$GLB,, | Performed by: INTERNAL MEDICINE

## 2020-05-25 PROCEDURE — 99214 PR OFFICE/OUTPT VISIT, EST, LEVL IV, 30-39 MIN: ICD-10-PCS | Mod: 25,S$GLB,, | Performed by: INTERNAL MEDICINE

## 2020-05-25 PROCEDURE — 93000 EKG 12-LEAD: ICD-10-PCS | Mod: S$GLB,,, | Performed by: INTERNAL MEDICINE

## 2020-05-25 PROCEDURE — 93000 ELECTROCARDIOGRAM COMPLETE: CPT | Mod: S$GLB,,, | Performed by: INTERNAL MEDICINE

## 2020-05-25 PROCEDURE — 99999 PR PBB SHADOW E&M-EST. PATIENT-LVL II: CPT | Mod: PBBFAC,,, | Performed by: INTERNAL MEDICINE

## 2020-05-25 PROCEDURE — 3074F PR MOST RECENT SYSTOLIC BLOOD PRESSURE < 130 MM HG: ICD-10-PCS | Mod: CPTII,S$GLB,, | Performed by: INTERNAL MEDICINE

## 2020-05-25 PROCEDURE — 3078F PR MOST RECENT DIASTOLIC BLOOD PRESSURE < 80 MM HG: ICD-10-PCS | Mod: CPTII,S$GLB,, | Performed by: INTERNAL MEDICINE

## 2020-05-25 PROCEDURE — 99214 OFFICE O/P EST MOD 30 MIN: CPT | Mod: 25,S$GLB,, | Performed by: INTERNAL MEDICINE

## 2020-05-25 PROCEDURE — 1159F MED LIST DOCD IN RCRD: CPT | Mod: S$GLB,,, | Performed by: INTERNAL MEDICINE

## 2020-05-25 PROCEDURE — 3074F SYST BP LT 130 MM HG: CPT | Mod: CPTII,S$GLB,, | Performed by: INTERNAL MEDICINE

## 2020-05-25 PROCEDURE — 99999 PR PBB SHADOW E&M-EST. PATIENT-LVL II: ICD-10-PCS | Mod: PBBFAC,,, | Performed by: INTERNAL MEDICINE

## 2020-05-25 PROCEDURE — 1101F PT FALLS ASSESS-DOCD LE1/YR: CPT | Mod: CPTII,S$GLB,, | Performed by: INTERNAL MEDICINE

## 2020-05-25 PROCEDURE — 1101F PR PT FALLS ASSESS DOC 0-1 FALLS W/OUT INJ PAST YR: ICD-10-PCS | Mod: CPTII,S$GLB,, | Performed by: INTERNAL MEDICINE

## 2020-05-25 NOTE — PROGRESS NOTES
Subjective:      Patient ID: Lisa Sepulveda is a 84 y.o. female.    Chief Complaint: Follow-up    HPI:  Active around the house.  Feels OK.  Stressed about Covid19      Review of Systems   Cardiovascular: Positive for leg swelling (rare, intermittent). Negative for chest pain, claudication, dyspnea on exertion, irregular heartbeat, near-syncope, orthopnea, palpitations and syncope.        Past Medical History:   Diagnosis Date    Aortic aneurysm     Arthritis     Hypertension         Past Surgical History:   Procedure Laterality Date    ABDOMINAL AORTIC ANEURYSM REPAIR      ABDOMINAL SURGERY         Family History   Problem Relation Age of Onset    Hypertension Father     Stroke Father     Diabetes Sister     Diabetes Sister        Social History     Socioeconomic History    Marital status: Single     Spouse name: Not on file    Number of children: Not on file    Years of education: Not on file    Highest education level: Not on file   Occupational History    Not on file   Social Needs    Financial resource strain: Not on file    Food insecurity:     Worry: Not on file     Inability: Not on file    Transportation needs:     Medical: Not on file     Non-medical: Not on file   Tobacco Use    Smoking status: Never Smoker    Smokeless tobacco: Never Used   Substance and Sexual Activity    Alcohol use: No    Drug use: No    Sexual activity: Never   Lifestyle    Physical activity:     Days per week: Not on file     Minutes per session: Not on file    Stress: Not on file   Relationships    Social connections:     Talks on phone: Not on file     Gets together: Not on file     Attends [employer] service: Not on file     Active member of club or organization: Not on file     Attends meetings of clubs or organizations: Not on file     Relationship status: Not on file   Other Topics Concern    Not on file   Social History Narrative    Not on file       Current Outpatient Medications on File Prior  "to Visit   Medication Sig Dispense Refill    albuterol 90 mcg/actuation inhaler Inhale 1-2 puffs into the lungs every 6 (six) hours as needed for Wheezing. Rescue 1 Inhaler 0    aspirin 81 MG Chew Take 81 mg by mouth once daily.      labetalol (NORMODYNE) 100 MG tablet Take 100 mg by mouth. Take 1/2 tablet twice daily      NIFEdipine (PROCARDIA-XL) 60 MG (OSM) 24 hr tablet TAKE ONE TABLET BY MOUTH ONCE DAILY 90 tablet 3    SIMBRINZA 1-0.2 % DrpS       timolol maleate 0.5% (TIMOPTIC) 0.5 % Drop        No current facility-administered medications on file prior to visit.        Review of patient's allergies indicates:  No Known Allergies  Objective:     Vitals:    05/25/20 1421   BP: 125/77   BP Location: Left arm   Patient Position: Sitting   BP Method: Large (Automatic)   Pulse: 81   Weight: 78.4 kg (172 lb 11.7 oz)   Height: 5' 4" (1.626 m)        Physical Exam   Constitutional: She is oriented to person, place, and time. She appears well-developed and well-nourished.   Eyes: No scleral icterus.   Neck: No JVD present. Carotid bruit is not present.   Cardiovascular: Normal rate and regular rhythm. Exam reveals no gallop.   No murmur heard.  Pulmonary/Chest: Breath sounds normal.   Musculoskeletal: She exhibits no edema.   Neurological: She is alert and oriented to person, place, and time.   Skin: Skin is warm and dry.   Psychiatric: She has a normal mood and affect. Her behavior is normal. Judgment and thought content normal.   Vitals reviewed.     Wt down 8 lbs    ECG: NSR, inverted T waves in inferior and anterolateral leads, PAC, unchanged    Note lab 2018    Note last CTA of aorta report 2017;  Stent repair noted    Assessment:     1. Hx of repair of dissecting thoracic aneurysm    2. Hypertension complications    3. Hypertensive heart disease without heart failure    4. Essential hypertension      Plan:   Lisa was seen today for follow-up.    Diagnoses and all orders for this visit:    Hx of repair of " dissecting thoracic aneurysm  -     IN OFFICE EKG 12-LEAD (to [facility])    Hypertension complications  -     IN OFFICE EKG 12-LEAD (to [place])    Hypertensive heart disease without heart failure  -     IN OFFICE EKG 12-LEAD (to [facility])    Essential hypertension  -     IN OFFICE EKG 12-LEAD (to [facility])     Cardiac status is stable     Same meds    F/u with Dr Gibson who does labs    RTC 6 months    Follow up in about 6 months (around 11/25/2020).

## 2020-11-27 ENCOUNTER — TELEPHONE (OUTPATIENT)
Dept: CARDIOLOGY | Facility: CLINIC | Age: 85
End: 2020-11-27

## 2020-11-27 NOTE — TELEPHONE ENCOUNTER
Daughter called and stated that her mother had a syncope episode on yesterday. Told her to bring her to the ER.

## 2020-12-10 ENCOUNTER — OFFICE VISIT (OUTPATIENT)
Dept: CARDIOLOGY | Facility: CLINIC | Age: 85
End: 2020-12-10
Payer: MEDICARE

## 2020-12-10 VITALS
HEART RATE: 87 BPM | WEIGHT: 172.19 LBS | DIASTOLIC BLOOD PRESSURE: 76 MMHG | OXYGEN SATURATION: 100 % | HEIGHT: 64 IN | BODY MASS INDEX: 29.4 KG/M2 | SYSTOLIC BLOOD PRESSURE: 118 MMHG

## 2020-12-10 DIAGNOSIS — R55 NEAR SYNCOPE: Primary | ICD-10-CM

## 2020-12-10 DIAGNOSIS — I10 ESSENTIAL HYPERTENSION: ICD-10-CM

## 2020-12-10 DIAGNOSIS — I49.1 PREMATURE ATRIAL CONTRACTIONS: ICD-10-CM

## 2020-12-10 DIAGNOSIS — I71.019 DISSECTION OF THORACIC AORTA: ICD-10-CM

## 2020-12-10 PROCEDURE — 3074F PR MOST RECENT SYSTOLIC BLOOD PRESSURE < 130 MM HG: ICD-10-PCS | Mod: CPTII,S$GLB,, | Performed by: INTERNAL MEDICINE

## 2020-12-10 PROCEDURE — 99999 PR PBB SHADOW E&M-EST. PATIENT-LVL III: ICD-10-PCS | Mod: PBBFAC,,, | Performed by: INTERNAL MEDICINE

## 2020-12-10 PROCEDURE — 3078F PR MOST RECENT DIASTOLIC BLOOD PRESSURE < 80 MM HG: ICD-10-PCS | Mod: CPTII,S$GLB,, | Performed by: INTERNAL MEDICINE

## 2020-12-10 PROCEDURE — 3288F FALL RISK ASSESSMENT DOCD: CPT | Mod: CPTII,S$GLB,, | Performed by: INTERNAL MEDICINE

## 2020-12-10 PROCEDURE — 1159F MED LIST DOCD IN RCRD: CPT | Mod: S$GLB,,, | Performed by: INTERNAL MEDICINE

## 2020-12-10 PROCEDURE — 99215 OFFICE O/P EST HI 40 MIN: CPT | Mod: 25,S$GLB,, | Performed by: INTERNAL MEDICINE

## 2020-12-10 PROCEDURE — 1101F PT FALLS ASSESS-DOCD LE1/YR: CPT | Mod: CPTII,S$GLB,, | Performed by: INTERNAL MEDICINE

## 2020-12-10 PROCEDURE — 93000 EKG 12-LEAD: ICD-10-PCS | Mod: S$GLB,,, | Performed by: INTERNAL MEDICINE

## 2020-12-10 PROCEDURE — 1159F PR MEDICATION LIST DOCUMENTED IN MEDICAL RECORD: ICD-10-PCS | Mod: S$GLB,,, | Performed by: INTERNAL MEDICINE

## 2020-12-10 PROCEDURE — 1101F PR PT FALLS ASSESS DOC 0-1 FALLS W/OUT INJ PAST YR: ICD-10-PCS | Mod: CPTII,S$GLB,, | Performed by: INTERNAL MEDICINE

## 2020-12-10 PROCEDURE — 3288F PR FALLS RISK ASSESSMENT DOCUMENTED: ICD-10-PCS | Mod: CPTII,S$GLB,, | Performed by: INTERNAL MEDICINE

## 2020-12-10 PROCEDURE — 3074F SYST BP LT 130 MM HG: CPT | Mod: CPTII,S$GLB,, | Performed by: INTERNAL MEDICINE

## 2020-12-10 PROCEDURE — 93000 ELECTROCARDIOGRAM COMPLETE: CPT | Mod: S$GLB,,, | Performed by: INTERNAL MEDICINE

## 2020-12-10 PROCEDURE — 3078F DIAST BP <80 MM HG: CPT | Mod: CPTII,S$GLB,, | Performed by: INTERNAL MEDICINE

## 2020-12-10 PROCEDURE — 99215 PR OFFICE/OUTPT VISIT, EST, LEVL V, 40-54 MIN: ICD-10-PCS | Mod: 25,S$GLB,, | Performed by: INTERNAL MEDICINE

## 2020-12-10 PROCEDURE — 99999 PR PBB SHADOW E&M-EST. PATIENT-LVL III: CPT | Mod: PBBFAC,,, | Performed by: INTERNAL MEDICINE

## 2020-12-10 NOTE — PROGRESS NOTES
Subjective:      Patient ID: Lisa Sepulveda is a 85 y.o. female.    Chief Complaint: Follow-up    HPI:  Pt was sitting and laughing and talking at Thanksgiving table and pt suddenly felt like everything was spinning.  Pt did not lose consciousness.  Pt had been cooking all morning and it was hot in the dining room.  Whole episode lasted a couple of seconds.  Pt felt off balance.  The episode began when pt leaned over the table to reach something.    BP shortly after the episode was OK    Last lab for Dr Gibson from May or June was OK.  Pt was instructed to begin vitamin d    Review of Systems   Cardiovascular: Positive for near-syncope. Negative for chest pain, claudication, dyspnea on exertion, irregular heartbeat, leg swelling, orthopnea, palpitations and syncope.        In general BP has been doing fine; BP is being monitored at home    Past Medical History:   Diagnosis Date    Aortic aneurysm     Arthritis     Hypertension         Past Surgical History:   Procedure Laterality Date    ABDOMINAL SURGERY      THORACIC AORTIC ANEURYSM REPAIR         Family History   Problem Relation Age of Onset    Hypertension Father     Stroke Father     Diabetes Sister     Diabetes Sister        Social History     Socioeconomic History    Marital status: Single     Spouse name: Not on file    Number of children: Not on file    Years of education: Not on file    Highest education level: Not on file   Occupational History    Not on file   Social Needs    Financial resource strain: Not on file    Food insecurity     Worry: Not on file     Inability: Not on file    Transportation needs     Medical: Not on file     Non-medical: Not on file   Tobacco Use    Smoking status: Never Smoker    Smokeless tobacco: Never Used   Substance and Sexual Activity    Alcohol use: No    Drug use: No    Sexual activity: Never   Lifestyle    Physical activity     Days per week: Not on file     Minutes per session: Not on file  "   Stress: Not on file   Relationships    Social connections     Talks on phone: Not on file     Gets together: Not on file     Attends [employer] service: Not on file     Active member of club or organization: Not on file     Attends meetings of clubs or organizations: Not on file     Relationship status: Not on file   Other Topics Concern    Not on file   Social History Narrative    Not on file       Current Outpatient Medications on File Prior to Visit   Medication Sig Dispense Refill    albuterol 90 mcg/actuation inhaler Inhale 1-2 puffs into the lungs every 6 (six) hours as needed for Wheezing. Rescue 1 Inhaler 0    aspirin 81 MG Chew Take 81 mg by mouth once daily.      labetalol (NORMODYNE) 100 MG tablet Take 100 mg by mouth. Take 1/2 tablet twice daily      NIFEdipine (PROCARDIA-XL) 60 MG (OSM) 24 hr tablet TAKE ONE TABLET BY MOUTH ONCE DAILY 90 tablet 3    SIMBRINZA 1-0.2 % DrpS       timolol maleate 0.5% (TIMOPTIC) 0.5 % Drop        No current facility-administered medications on file prior to visit.        Review of patient's allergies indicates:  No Known Allergies  Objective:     Vitals:    12/10/20 1425   BP: 118/76   BP Location: Left arm   Patient Position: Sitting   BP Method: Large (Automatic)   Pulse: 87   SpO2: 100%   Weight: 78.1 kg (172 lb 2.9 oz)   Height: 5' 4" (1.626 m)        Physical Exam   Constitutional: She is oriented to person, place, and time. She appears well-developed and well-nourished.   Eyes: No scleral icterus.   Neck: No JVD present. Carotid bruit is not present.   Cardiovascular: Normal rate and regular rhythm. Exam reveals no gallop.   No murmur heard.  Pulmonary/Chest: Breath sounds normal.   Musculoskeletal:         General: No edema.   Neurological: She is alert and oriented to person, place, and time.   Skin: Skin is warm and dry.   Psychiatric: She has a normal mood and affect. Her behavior is normal. Judgment and thought content normal.   Vitals reviewed.   "   ECG: NSR with PAC, nonspecific T wave abnormality, reviewed by me, unchanged    No visits with results within 6 Month(s) from this visit.   Latest known visit with results is:   Admission on 05/30/2018, Discharged on 05/30/2018   Component Date Value Ref Range Status    WBC 05/30/2018 7.79  3.90 - 12.70 K/uL Final    RBC 05/30/2018 4.70  4.00 - 5.40 M/uL Final    Hemoglobin 05/30/2018 13.5  12.0 - 16.0 g/dL Final    Hematocrit 05/30/2018 41.5  37.0 - 48.5 % Final    MCV 05/30/2018 88  82 - 98 fL Final    MCH 05/30/2018 28.7  27.0 - 31.0 pg Final    MCHC 05/30/2018 32.5  32.0 - 36.0 g/dL Final    RDW 05/30/2018 14.8* 11.5 - 14.5 % Final    Platelets 05/30/2018 274  150 - 350 K/uL Final    MPV 05/30/2018 11.7  9.2 - 12.9 fL Final    Gran # (ANC) 05/30/2018 5.0  1.8 - 7.7 K/uL Final    Lymph # 05/30/2018 1.7  1.0 - 4.8 K/uL Final    Mono # 05/30/2018 0.8  0.3 - 1.0 K/uL Final    Eos # 05/30/2018 0.3  0.0 - 0.5 K/uL Final    Baso # 05/30/2018 0.05  0.00 - 0.20 K/uL Final    Gran % 05/30/2018 63.7  38.0 - 73.0 % Final    Lymph % 05/30/2018 22.0  18.0 - 48.0 % Final    Mono % 05/30/2018 9.8  4.0 - 15.0 % Final    Eosinophil % 05/30/2018 3.6  0.0 - 8.0 % Final    Basophil % 05/30/2018 0.6  0.0 - 1.9 % Final    Differential Method 05/30/2018 Automated   Final    Sodium 05/30/2018 139  136 - 145 mmol/L Final    Potassium 05/30/2018 3.4* 3.5 - 5.1 mmol/L Final    Chloride 05/30/2018 103  95 - 110 mmol/L Final    CO2 05/30/2018 26  23 - 29 mmol/L Final    Glucose 05/30/2018 95  70 - 110 mg/dL Final    BUN 05/30/2018 10  8 - 23 mg/dL Final    Creatinine 05/30/2018 0.9  0.5 - 1.4 mg/dL Final    Calcium 05/30/2018 9.4  8.7 - 10.5 mg/dL Final    Total Protein 05/30/2018 7.8  6.0 - 8.4 g/dL Final    Albumin 05/30/2018 3.4* 3.5 - 5.2 g/dL Final    Total Bilirubin 05/30/2018 0.4  0.1 - 1.0 mg/dL Final    Alkaline Phosphatase 05/30/2018 88  55 - 135 U/L Final    AST 05/30/2018 27  10 - 40 U/L Final     ALT 05/30/2018 21  10 - 44 U/L Final    Anion Gap 05/30/2018 10  8 - 16 mmol/L Final    eGFR if African American 05/30/2018 >60  >60 mL/min/1.73 m^2 Final    eGFR if non African American 05/30/2018 >60  >60 mL/min/1.73 m^2 Final    Troponin I 05/30/2018 <0.006  0.000 - 0.026 ng/mL Final    BNP 05/30/2018 111* 0 - 99 pg/mL Final    Prothrombin Time 05/30/2018 11.1  9.0 - 12.5 sec Final    INR 05/30/2018 1.1  0.8 - 1.2 Final   (    Assessment:     1. Near syncope    2. Dissection of thoracic aorta    3. Essential hypertension    4. Premature atrial contractions      Plan:   Lisa was seen today for follow-up.    Diagnoses and all orders for this visit:    Near syncope  -     CBC Auto Differential; Future  -     Comprehensive Metabolic Panel; Future  -     TSH; Future  -     Lipid Panel; Future  -     Echo Color Flow Doppler? Yes  -     Holter monitor - 48 hour; Future  -     US Carotid Bilateral; Future    Dissection of thoracic aorta  -     IN OFFICE EKG 12-LEAD (to [place])  -     CTA Chest Abdomen Non Coronary; Future  -     CBC Auto Differential; Future  -     Comprehensive Metabolic Panel; Future  -     TSH; Future  -     Lipid Panel; Future  -     Echo Color Flow Doppler? Yes  -     Holter monitor - 48 hour; Future  -     US Carotid Bilateral; Future    Essential hypertension  -     IN OFFICE EKG 12-LEAD (to [facility])  -     Comprehensive Metabolic Panel; Future  -     TSH; Future  -     Lipid Panel; Future    Premature atrial contractions  -     TSH; Future        Dizzy spell of uncertain etiology: benign positional vertigo vs orthostatic hypotension vs. Vasovagal event vs. Posterior circulation TIA vs arrhythmia    Will repeat CTA of chest and neck to f/u endovascular repair of type B thoracic aortic aneurysm    Carotid ultrasound and doppler    48 hour holter monitor    Repeat lab      No follow-ups on file.

## 2020-12-22 ENCOUNTER — LAB VISIT (OUTPATIENT)
Dept: LAB | Facility: HOSPITAL | Age: 85
End: 2020-12-22
Attending: INTERNAL MEDICINE
Payer: MEDICARE

## 2020-12-22 DIAGNOSIS — R55 NEAR SYNCOPE: ICD-10-CM

## 2020-12-22 DIAGNOSIS — I49.1 PREMATURE ATRIAL CONTRACTIONS: ICD-10-CM

## 2020-12-22 DIAGNOSIS — I10 ESSENTIAL HYPERTENSION: ICD-10-CM

## 2020-12-22 DIAGNOSIS — I71.019 DISSECTION OF THORACIC AORTA: ICD-10-CM

## 2020-12-22 LAB
ALBUMIN SERPL BCP-MCNC: 3.7 G/DL (ref 3.5–5.2)
ALP SERPL-CCNC: 74 U/L (ref 55–135)
ALT SERPL W/O P-5'-P-CCNC: 24 U/L (ref 10–44)
ANION GAP SERPL CALC-SCNC: 9 MMOL/L (ref 8–16)
AST SERPL-CCNC: 32 U/L (ref 10–40)
BASOPHILS # BLD AUTO: 0.09 K/UL (ref 0–0.2)
BASOPHILS NFR BLD: 1.7 % (ref 0–1.9)
BILIRUB SERPL-MCNC: 0.2 MG/DL (ref 0.1–1)
BUN SERPL-MCNC: 17 MG/DL (ref 8–23)
CALCIUM SERPL-MCNC: 9.9 MG/DL (ref 8.7–10.5)
CHLORIDE SERPL-SCNC: 106 MMOL/L (ref 95–110)
CHOLEST SERPL-MCNC: 192 MG/DL (ref 120–199)
CHOLEST/HDLC SERPL: 3.6 {RATIO} (ref 2–5)
CO2 SERPL-SCNC: 30 MMOL/L (ref 23–29)
CREAT SERPL-MCNC: 1 MG/DL (ref 0.5–1.4)
DIFFERENTIAL METHOD: ABNORMAL
EOSINOPHIL # BLD AUTO: 0.2 K/UL (ref 0–0.5)
EOSINOPHIL NFR BLD: 3.3 % (ref 0–8)
ERYTHROCYTE [DISTWIDTH] IN BLOOD BY AUTOMATED COUNT: 15.4 % (ref 11.5–14.5)
EST. GFR  (AFRICAN AMERICAN): >60 ML/MIN/1.73 M^2
EST. GFR  (NON AFRICAN AMERICAN): 52 ML/MIN/1.73 M^2
GLUCOSE SERPL-MCNC: 95 MG/DL (ref 70–110)
HCT VFR BLD AUTO: 41 % (ref 37–48.5)
HDLC SERPL-MCNC: 54 MG/DL (ref 40–75)
HDLC SERPL: 28.1 % (ref 20–50)
HGB BLD-MCNC: 13 G/DL (ref 12–16)
IMM GRANULOCYTES # BLD AUTO: 0.02 K/UL (ref 0–0.04)
IMM GRANULOCYTES NFR BLD AUTO: 0.4 % (ref 0–0.5)
LDLC SERPL CALC-MCNC: 121.8 MG/DL (ref 63–159)
LYMPHOCYTES # BLD AUTO: 1.9 K/UL (ref 1–4.8)
LYMPHOCYTES NFR BLD: 35.5 % (ref 18–48)
MCH RBC QN AUTO: 28.1 PG (ref 27–31)
MCHC RBC AUTO-ENTMCNC: 31.7 G/DL (ref 32–36)
MCV RBC AUTO: 89 FL (ref 82–98)
MONOCYTES # BLD AUTO: 0.4 K/UL (ref 0.3–1)
MONOCYTES NFR BLD: 7.5 % (ref 4–15)
NEUTROPHILS # BLD AUTO: 2.8 K/UL (ref 1.8–7.7)
NEUTROPHILS NFR BLD: 51.6 % (ref 38–73)
NONHDLC SERPL-MCNC: 138 MG/DL
NRBC BLD-RTO: 0 /100 WBC
PLATELET # BLD AUTO: 247 K/UL (ref 150–350)
PMV BLD AUTO: 12 FL (ref 9.2–12.9)
POTASSIUM SERPL-SCNC: 4.2 MMOL/L (ref 3.5–5.1)
PROT SERPL-MCNC: 7.5 G/DL (ref 6–8.4)
RBC # BLD AUTO: 4.62 M/UL (ref 4–5.4)
SODIUM SERPL-SCNC: 145 MMOL/L (ref 136–145)
TRIGL SERPL-MCNC: 81 MG/DL (ref 30–150)
TSH SERPL DL<=0.005 MIU/L-ACNC: 3.87 UIU/ML (ref 0.4–4)
WBC # BLD AUTO: 5.44 K/UL (ref 3.9–12.7)

## 2020-12-22 PROCEDURE — 80061 LIPID PANEL: CPT

## 2020-12-22 PROCEDURE — 84443 ASSAY THYROID STIM HORMONE: CPT

## 2020-12-22 PROCEDURE — 36415 COLL VENOUS BLD VENIPUNCTURE: CPT

## 2020-12-22 PROCEDURE — 80053 COMPREHEN METABOLIC PANEL: CPT

## 2020-12-22 PROCEDURE — 85025 COMPLETE CBC W/AUTO DIFF WBC: CPT

## 2020-12-23 ENCOUNTER — HOSPITAL ENCOUNTER (OUTPATIENT)
Dept: CARDIOLOGY | Facility: HOSPITAL | Age: 85
Discharge: HOME | End: 2020-12-23
Attending: INTERNAL MEDICINE
Payer: MEDICARE

## 2020-12-23 ENCOUNTER — HOSPITAL ENCOUNTER (OUTPATIENT)
Dept: RADIOLOGY | Facility: HOSPITAL | Age: 85
Discharge: HOME | End: 2020-12-23
Attending: INTERNAL MEDICINE
Payer: MEDICARE

## 2020-12-23 VITALS — WEIGHT: 172 LBS | BODY MASS INDEX: 29.37 KG/M2 | HEIGHT: 64 IN

## 2020-12-23 DIAGNOSIS — R55 NEAR SYNCOPE: ICD-10-CM

## 2020-12-23 DIAGNOSIS — I71.019 DISSECTION OF THORACIC AORTA: ICD-10-CM

## 2020-12-23 LAB
AORTIC ROOT ANNULUS: 3.25 CM
ASCENDING AORTA: 3.16 CM
AV INDEX (PROSTH): 1.08
AV MEAN GRADIENT: 3 MMHG
AV PEAK GRADIENT: 5 MMHG
AV VALVE AREA: 2.93 CM2
AV VELOCITY RATIO: 0.84
BSA FOR ECHO PROCEDURE: 1.88 M2
CV ECHO LV RWT: 1.02 CM
DOP CALC AO PEAK VEL: 1.09 M/S
DOP CALC AO VTI: 17.47 CM
DOP CALC LVOT AREA: 2.7 CM2
DOP CALC LVOT DIAMETER: 1.86 CM
DOP CALC LVOT PEAK VEL: 0.92 M/S
DOP CALC LVOT STROKE VOLUME: 51.14 CM3
DOP CALCLVOT PEAK VEL VTI: 18.83 CM
E WAVE DECELERATION TIME: 196.93 MSEC
E/A RATIO: 0.97
E/E' RATIO: 11 M/S
ECHO LV POSTERIOR WALL: 1.3 CM (ref 0.6–1.1)
FRACTIONAL SHORTENING: 27 % (ref 28–44)
INTERVENTRICULAR SEPTUM: 1.3 CM (ref 0.6–1.1)
IVRT: 125.59 MSEC
LA MAJOR: 4.46 CM
LA MINOR: 5.71 CM
LA WIDTH: 3.89 CM
LEFT ATRIUM SIZE: 4.1 CM
LEFT ATRIUM VOLUME INDEX MOD: 20.9 ML/M2
LEFT ATRIUM VOLUME INDEX: 37 ML/M2
LEFT ATRIUM VOLUME MOD: 38.34 CM3
LEFT ATRIUM VOLUME: 67.89 CM3
LEFT INTERNAL DIMENSION IN SYSTOLE: 1.85 CM (ref 2.1–4)
LEFT VENTRICLE DIASTOLIC VOLUME INDEX: 12.77 ML/M2
LEFT VENTRICLE DIASTOLIC VOLUME: 23.43 ML
LEFT VENTRICLE MASS INDEX: 55 G/M2
LEFT VENTRICLE SYSTOLIC VOLUME INDEX: 5.7 ML/M2
LEFT VENTRICLE SYSTOLIC VOLUME: 10.43 ML
LEFT VENTRICULAR INTERNAL DIMENSION IN DIASTOLE: 2.55 CM (ref 3.5–6)
LEFT VENTRICULAR MASS: 100.45 G
LV LATERAL E/E' RATIO: 9.43 M/S
LV SEPTAL E/E' RATIO: 13.2 M/S
MV A" WAVE DURATION": 10.28 MSEC
MV PEAK A VEL: 0.68 M/S
MV PEAK E VEL: 0.66 M/S
PISA TR MAX VEL: 1.89 M/S
PULM VEIN S/D RATIO: 1.74
PV PEAK D VEL: 0.23 M/S
PV PEAK S VEL: 0.4 M/S
RA MAJOR: 4.32 CM
RA PRESSURE: 3 MMHG
RA WIDTH: 3.14 CM
RIGHT VENTRICULAR END-DIASTOLIC DIMENSION: 2.44 CM
RV TISSUE DOPPLER FREE WALL SYSTOLIC VELOCITY 1 (APICAL 4 CHAMBER VIEW): 6.52 CM/S
STJ: 3.36 CM
TDI LATERAL: 0.07 M/S
TDI SEPTAL: 0.05 M/S
TDI: 0.06 M/S
TR MAX PG: 14 MMHG
TRICUSPID ANNULAR PLANE SYSTOLIC EXCURSION: 1.75 CM
TV REST PULMONARY ARTERY PRESSURE: 17 MMHG

## 2020-12-23 PROCEDURE — 93880 EXTRACRANIAL BILAT STUDY: CPT | Mod: TC

## 2020-12-23 PROCEDURE — 74175 CTA ABDOMEN W/CONTRAST: CPT | Mod: 26,,, | Performed by: SPECIALIST

## 2020-12-23 PROCEDURE — 74175 CTA CHEST ABDOMEN NON CORONARY (XPD): ICD-10-PCS | Mod: 26,,, | Performed by: SPECIALIST

## 2020-12-23 PROCEDURE — 93880 US CAROTID BILATERAL: ICD-10-PCS | Mod: 26,,, | Performed by: RADIOLOGY

## 2020-12-23 PROCEDURE — 93227 XTRNL ECG REC<48 HR R&I: CPT | Mod: ,,, | Performed by: INTERNAL MEDICINE

## 2020-12-23 PROCEDURE — 93306 ECHO (CUPID ONLY): ICD-10-PCS | Mod: 26,,, | Performed by: INTERNAL MEDICINE

## 2020-12-23 PROCEDURE — 93306 TTE W/DOPPLER COMPLETE: CPT | Mod: 26,,, | Performed by: INTERNAL MEDICINE

## 2020-12-23 PROCEDURE — 93226 XTRNL ECG REC<48 HR SCAN A/R: CPT

## 2020-12-23 PROCEDURE — 71275 CT ANGIOGRAPHY CHEST: CPT | Mod: 26,,, | Performed by: SPECIALIST

## 2020-12-23 PROCEDURE — 74175 CTA ABDOMEN W/CONTRAST: CPT | Mod: TC

## 2020-12-23 PROCEDURE — 93306 TTE W/DOPPLER COMPLETE: CPT

## 2020-12-23 PROCEDURE — 93227 HOLTER MONITOR - 48 HOUR (CUPID ONLY): ICD-10-PCS | Mod: ,,, | Performed by: INTERNAL MEDICINE

## 2020-12-23 PROCEDURE — 93880 EXTRACRANIAL BILAT STUDY: CPT | Mod: 26,,, | Performed by: RADIOLOGY

## 2020-12-23 PROCEDURE — 25500020 PHARM REV CODE 255: Performed by: INTERNAL MEDICINE

## 2020-12-23 PROCEDURE — 71275 CTA CHEST ABDOMEN NON CORONARY (XPD): ICD-10-PCS | Mod: 26,,, | Performed by: SPECIALIST

## 2020-12-23 RX ADMIN — IOHEXOL 100 ML: 350 INJECTION, SOLUTION INTRAVENOUS at 08:12

## 2020-12-24 ENCOUNTER — TELEPHONE (OUTPATIENT)
Dept: CARDIOLOGY | Facility: CLINIC | Age: 85
End: 2020-12-24

## 2020-12-24 DIAGNOSIS — I77.79: Primary | ICD-10-CM

## 2020-12-24 NOTE — TELEPHONE ENCOUNTER
I spoke with pt and daughter:  Echocardiogram shows heart is strong.  Carotid ultrasound OK  CTA shows aortic aneurysm stent repair is stable.  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 Will have pt consult with Dr Miguel in the office.

## 2020-12-28 ENCOUNTER — TELEPHONE (OUTPATIENT)
Dept: CARDIOLOGY | Facility: CLINIC | Age: 85
End: 2020-12-28

## 2020-12-28 NOTE — TELEPHONE ENCOUNTER
Left detailed message requesting a call back in regards to scheduling pt for a clinical visit with Dr. Miguel    Pt is being referred by Dr. Pereira for clinical consult to discuss a procedure based off test results pt had done     Requested a call back to schedule soonest lizet available, 1/6 at the [facility] location

## 2020-12-28 NOTE — TELEPHONE ENCOUNTER
----- Message from Yue Hernandez sent at 12/28/2020  2:46 PM CST -----  Contact: [phone]/Self  Type:  Patient Returning Call    Who Called:Pt  Who Left Message for Patient:Unknown   Does the patient know what this is regarding?:schedule appt   Would the patient rather a call back or a response via [facility]? Call back   Best Call Back Number:[phone]  Additional Information:

## 2020-12-28 NOTE — TELEPHONE ENCOUNTER
Returned pt phone call     Pt was scheduled for a clinical consult with Dr. Miguel to discuss subclavian dissection

## 2020-12-29 ENCOUNTER — TELEPHONE (OUTPATIENT)
Dept: CARDIOLOGY | Facility: CLINIC | Age: 85
End: 2020-12-29

## 2020-12-29 LAB
OHS CV EVENT MONITOR DAY: 0
OHS CV HOLTER LENGTH DECIMAL HOURS: 47.98
OHS CV HOLTER LENGTH HOURS: 47
OHS CV HOLTER LENGTH MINUTES: 59

## 2020-12-30 NOTE — TELEPHONE ENCOUNTER
I spoke with pt:  holter shows frequent PAC's including couplets and triplets and EAT up to r beats in a row.  No explanation for near syncope.  Continue low dose of labetalol.

## 2021-01-06 ENCOUNTER — OFFICE VISIT (OUTPATIENT)
Dept: CARDIOLOGY | Facility: CLINIC | Age: 86
End: 2021-01-06
Payer: MEDICARE

## 2021-01-06 VITALS
HEIGHT: 64 IN | BODY MASS INDEX: 28.85 KG/M2 | WEIGHT: 169 LBS | OXYGEN SATURATION: 98 % | SYSTOLIC BLOOD PRESSURE: 126 MMHG | DIASTOLIC BLOOD PRESSURE: 81 MMHG | HEART RATE: 79 BPM

## 2021-01-06 DIAGNOSIS — I71.10 RUPTURED ANEURYSM OF THORACIC AORTA: ICD-10-CM

## 2021-01-06 DIAGNOSIS — I51.7 LVH (LEFT VENTRICULAR HYPERTROPHY): ICD-10-CM

## 2021-01-06 DIAGNOSIS — I77.79: Primary | ICD-10-CM

## 2021-01-06 DIAGNOSIS — Z86.79 HX OF REPAIR OF DISSECTING THORACIC ANEURYSM: ICD-10-CM

## 2021-01-06 DIAGNOSIS — I71.019 DISSECTION OF THORACIC AORTA: ICD-10-CM

## 2021-01-06 DIAGNOSIS — Z98.890 HX OF REPAIR OF DISSECTING THORACIC ANEURYSM: ICD-10-CM

## 2021-01-06 DIAGNOSIS — I71.03 DISSECTION OF THORACOABDOMINAL AORTIC ANEURYSM (TAAA): ICD-10-CM

## 2021-01-06 DIAGNOSIS — I10 HYPERTENSION COMPLICATIONS: ICD-10-CM

## 2021-01-06 PROCEDURE — 99999 PR PBB SHADOW E&M-EST. PATIENT-LVL III: ICD-10-PCS | Mod: PBBFAC,,, | Performed by: INTERNAL MEDICINE

## 2021-01-06 PROCEDURE — 99999 PR PBB SHADOW E&M-EST. PATIENT-LVL III: CPT | Mod: PBBFAC,,, | Performed by: INTERNAL MEDICINE

## 2021-01-06 PROCEDURE — 1126F AMNT PAIN NOTED NONE PRSNT: CPT | Mod: S$GLB,,, | Performed by: INTERNAL MEDICINE

## 2021-01-06 PROCEDURE — 3074F SYST BP LT 130 MM HG: CPT | Mod: CPTII,S$GLB,, | Performed by: INTERNAL MEDICINE

## 2021-01-06 PROCEDURE — 1159F MED LIST DOCD IN RCRD: CPT | Mod: S$GLB,,, | Performed by: INTERNAL MEDICINE

## 2021-01-06 PROCEDURE — 3288F PR FALLS RISK ASSESSMENT DOCUMENTED: ICD-10-PCS | Mod: CPTII,S$GLB,, | Performed by: INTERNAL MEDICINE

## 2021-01-06 PROCEDURE — 99205 OFFICE O/P NEW HI 60 MIN: CPT | Mod: S$GLB,,, | Performed by: INTERNAL MEDICINE

## 2021-01-06 PROCEDURE — 1159F PR MEDICATION LIST DOCUMENTED IN MEDICAL RECORD: ICD-10-PCS | Mod: S$GLB,,, | Performed by: INTERNAL MEDICINE

## 2021-01-06 PROCEDURE — 1101F PT FALLS ASSESS-DOCD LE1/YR: CPT | Mod: CPTII,S$GLB,, | Performed by: INTERNAL MEDICINE

## 2021-01-06 PROCEDURE — 3074F PR MOST RECENT SYSTOLIC BLOOD PRESSURE < 130 MM HG: ICD-10-PCS | Mod: CPTII,S$GLB,, | Performed by: INTERNAL MEDICINE

## 2021-01-06 PROCEDURE — 1101F PR PT FALLS ASSESS DOC 0-1 FALLS W/OUT INJ PAST YR: ICD-10-PCS | Mod: CPTII,S$GLB,, | Performed by: INTERNAL MEDICINE

## 2021-01-06 PROCEDURE — 1126F PR PAIN SEVERITY QUANTIFIED, NO PAIN PRESENT: ICD-10-PCS | Mod: S$GLB,,, | Performed by: INTERNAL MEDICINE

## 2021-01-06 PROCEDURE — 3079F PR MOST RECENT DIASTOLIC BLOOD PRESSURE 80-89 MM HG: ICD-10-PCS | Mod: CPTII,S$GLB,, | Performed by: INTERNAL MEDICINE

## 2021-01-06 PROCEDURE — 3288F FALL RISK ASSESSMENT DOCD: CPT | Mod: CPTII,S$GLB,, | Performed by: INTERNAL MEDICINE

## 2021-01-06 PROCEDURE — 3079F DIAST BP 80-89 MM HG: CPT | Mod: CPTII,S$GLB,, | Performed by: INTERNAL MEDICINE

## 2021-01-06 PROCEDURE — 99205 PR OFFICE/OUTPT VISIT, NEW, LEVL V, 60-74 MIN: ICD-10-PCS | Mod: S$GLB,,, | Performed by: INTERNAL MEDICINE

## 2021-01-06 RX ORDER — ROSUVASTATIN CALCIUM 20 MG/1
20 TABLET, COATED ORAL NIGHTLY
Qty: 90 TABLET | Refills: 3 | Status: SHIPPED | OUTPATIENT
Start: 2021-01-06 | End: 2022-02-17

## 2021-05-06 ENCOUNTER — TELEPHONE (OUTPATIENT)
Dept: CARDIOLOGY | Facility: CLINIC | Age: 86
End: 2021-05-06

## 2021-09-28 ENCOUNTER — OFFICE VISIT (OUTPATIENT)
Dept: CARDIOLOGY | Facility: CLINIC | Age: 86
End: 2021-09-28
Payer: MEDICARE

## 2021-09-28 VITALS
HEART RATE: 82 BPM | SYSTOLIC BLOOD PRESSURE: 137 MMHG | WEIGHT: 170 LBS | HEIGHT: 64 IN | DIASTOLIC BLOOD PRESSURE: 85 MMHG | BODY MASS INDEX: 29.02 KG/M2

## 2021-09-28 DIAGNOSIS — I71.03 DISSECTION OF THORACOABDOMINAL AORTIC ANEURYSM (TAAA): ICD-10-CM

## 2021-09-28 DIAGNOSIS — Z86.79 HX OF REPAIR OF DISSECTING THORACIC ANEURYSM: ICD-10-CM

## 2021-09-28 DIAGNOSIS — I11.9 HYPERTENSIVE HEART DISEASE WITHOUT HEART FAILURE: ICD-10-CM

## 2021-09-28 DIAGNOSIS — Z98.890 HX OF REPAIR OF DISSECTING THORACIC ANEURYSM: ICD-10-CM

## 2021-09-28 DIAGNOSIS — I10 ESSENTIAL HYPERTENSION: ICD-10-CM

## 2021-09-28 DIAGNOSIS — I71.019 DISSECTION OF THORACIC AORTA: Primary | ICD-10-CM

## 2021-09-28 PROCEDURE — 3079F DIAST BP 80-89 MM HG: CPT | Mod: CPTII,S$GLB,, | Performed by: INTERNAL MEDICINE

## 2021-09-28 PROCEDURE — 1160F PR REVIEW ALL MEDS BY PRESCRIBER/CLIN PHARMACIST DOCUMENTED: ICD-10-PCS | Mod: CPTII,S$GLB,, | Performed by: INTERNAL MEDICINE

## 2021-09-28 PROCEDURE — 3288F FALL RISK ASSESSMENT DOCD: CPT | Mod: CPTII,S$GLB,, | Performed by: INTERNAL MEDICINE

## 2021-09-28 PROCEDURE — 1126F AMNT PAIN NOTED NONE PRSNT: CPT | Mod: CPTII,S$GLB,, | Performed by: INTERNAL MEDICINE

## 2021-09-28 PROCEDURE — 1101F PT FALLS ASSESS-DOCD LE1/YR: CPT | Mod: CPTII,S$GLB,, | Performed by: INTERNAL MEDICINE

## 2021-09-28 PROCEDURE — 1159F PR MEDICATION LIST DOCUMENTED IN MEDICAL RECORD: ICD-10-PCS | Mod: CPTII,S$GLB,, | Performed by: INTERNAL MEDICINE

## 2021-09-28 PROCEDURE — 1126F PR PAIN SEVERITY QUANTIFIED, NO PAIN PRESENT: ICD-10-PCS | Mod: CPTII,S$GLB,, | Performed by: INTERNAL MEDICINE

## 2021-09-28 PROCEDURE — 99214 PR OFFICE/OUTPT VISIT, EST, LEVL IV, 30-39 MIN: ICD-10-PCS | Mod: 25,S$GLB,, | Performed by: INTERNAL MEDICINE

## 2021-09-28 PROCEDURE — 3288F PR FALLS RISK ASSESSMENT DOCUMENTED: ICD-10-PCS | Mod: CPTII,S$GLB,, | Performed by: INTERNAL MEDICINE

## 2021-09-28 PROCEDURE — 3075F SYST BP GE 130 - 139MM HG: CPT | Mod: CPTII,S$GLB,, | Performed by: INTERNAL MEDICINE

## 2021-09-28 PROCEDURE — 1159F MED LIST DOCD IN RCRD: CPT | Mod: CPTII,S$GLB,, | Performed by: INTERNAL MEDICINE

## 2021-09-28 PROCEDURE — 1101F PR PT FALLS ASSESS DOC 0-1 FALLS W/OUT INJ PAST YR: ICD-10-PCS | Mod: CPTII,S$GLB,, | Performed by: INTERNAL MEDICINE

## 2021-09-28 PROCEDURE — 1160F RVW MEDS BY RX/DR IN RCRD: CPT | Mod: CPTII,S$GLB,, | Performed by: INTERNAL MEDICINE

## 2021-09-28 PROCEDURE — 99214 OFFICE O/P EST MOD 30 MIN: CPT | Mod: 25,S$GLB,, | Performed by: INTERNAL MEDICINE

## 2021-09-28 PROCEDURE — 93000 ELECTROCARDIOGRAM COMPLETE: CPT | Mod: S$GLB,,, | Performed by: INTERNAL MEDICINE

## 2021-09-28 PROCEDURE — 99999 PR PBB SHADOW E&M-EST. PATIENT-LVL III: ICD-10-PCS | Mod: PBBFAC,,, | Performed by: INTERNAL MEDICINE

## 2021-09-28 PROCEDURE — 99999 PR PBB SHADOW E&M-EST. PATIENT-LVL III: CPT | Mod: PBBFAC,,, | Performed by: INTERNAL MEDICINE

## 2021-09-28 PROCEDURE — 3079F PR MOST RECENT DIASTOLIC BLOOD PRESSURE 80-89 MM HG: ICD-10-PCS | Mod: CPTII,S$GLB,, | Performed by: INTERNAL MEDICINE

## 2021-09-28 PROCEDURE — 3075F PR MOST RECENT SYSTOLIC BLOOD PRESS GE 130-139MM HG: ICD-10-PCS | Mod: CPTII,S$GLB,, | Performed by: INTERNAL MEDICINE

## 2021-09-28 PROCEDURE — 93000 EKG 12-LEAD: ICD-10-PCS | Mod: S$GLB,,, | Performed by: INTERNAL MEDICINE

## 2021-09-28 RX ORDER — ALBUTEROL SULFATE 90 UG/1
1-2 AEROSOL, METERED RESPIRATORY (INHALATION) EVERY 6 HOURS PRN
Qty: 18 G | Refills: 11 | Status: SHIPPED | OUTPATIENT
Start: 2021-09-28

## 2021-12-13 ENCOUNTER — TELEPHONE (OUTPATIENT)
Dept: CARDIOLOGY | Facility: CLINIC | Age: 86
End: 2021-12-13
Payer: MEDICARE

## 2021-12-14 ENCOUNTER — HOSPITAL ENCOUNTER (EMERGENCY)
Facility: HOSPITAL | Age: 86
Discharge: HOME | End: 2021-12-14
Attending: EMERGENCY MEDICINE
Payer: MEDICARE

## 2021-12-14 VITALS
BODY MASS INDEX: 29.88 KG/M2 | SYSTOLIC BLOOD PRESSURE: 137 MMHG | OXYGEN SATURATION: 97 % | TEMPERATURE: 98 F | HEART RATE: 73 BPM | HEIGHT: 64 IN | RESPIRATION RATE: 18 BRPM | DIASTOLIC BLOOD PRESSURE: 82 MMHG | WEIGHT: 175 LBS

## 2021-12-14 DIAGNOSIS — S62.611A DISPLACED FRACTURE OF PROXIMAL PHALANX OF LEFT INDEX FINGER, INITIAL ENCOUNTER FOR CLOSED FRACTURE: ICD-10-CM
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
## 2021-12-14 DIAGNOSIS — S01.01XA LACERATION OF SCALP WITHOUT FOREIGN BODY, INITIAL ENCOUNTER: ICD-10-CM

## 2021-12-14 DIAGNOSIS — S00.03XA CONTUSION OF SCALP, INITIAL ENCOUNTER: ICD-10-CM

## 2021-12-14 PROCEDURE — 12001 RPR S/N/AX/GEN/TRNK 2.5CM/<: CPT

## 2021-12-14 PROCEDURE — 99284 EMERGENCY DEPT VISIT MOD MDM: CPT | Mod: 25

## 2021-12-14 PROCEDURE — 90715 TDAP VACCINE 7 YRS/> IM: CPT | Performed by: PHYSICIAN ASSISTANT

## 2021-12-14 PROCEDURE — 25000003 PHARM REV CODE 250: Performed by: PHYSICIAN ASSISTANT

## 2021-12-14 PROCEDURE — 63600175 PHARM REV CODE 636 W HCPCS: Performed by: PHYSICIAN ASSISTANT

## 2021-12-14 PROCEDURE — 90471 IMMUNIZATION ADMIN: CPT | Performed by: PHYSICIAN ASSISTANT

## 2021-12-14 RX ORDER — ACETAMINOPHEN 500 MG
500 TABLET ORAL
Status: COMPLETED | OUTPATIENT
Start: 2021-12-14 | End: 2021-12-14

## 2021-12-14 RX ADMIN — ACETAMINOPHEN 500 MG: 500 TABLET ORAL at 01:12

## 2021-12-14 RX ADMIN — TETANUS TOXOID, REDUCED DIPHTHERIA TOXOID AND ACELLULAR PERTUSSIS VACCINE, ADSORBED 0.5 ML: 5; 2.5; 8; 8; 2.5 SUSPENSION INTRAMUSCULAR at 01:12

## 2021-12-16 ENCOUNTER — OFFICE VISIT (OUTPATIENT)
Dept: ORTHOPEDICS | Facility: CLINIC | Age: 86
End: 2021-12-16
Payer: MEDICARE

## 2021-12-16 VITALS — BODY MASS INDEX: 29.88 KG/M2 | HEIGHT: 64 IN | WEIGHT: 175 LBS

## 2021-12-16 DIAGNOSIS — S62.611D CLOSED DISPLACED FRACTURE OF PROXIMAL PHALANX OF LEFT INDEX FINGER WITH ROUTINE HEALING: ICD-10-CM

## 2021-12-16 DIAGNOSIS — S62.611A DISPLACED FRACTURE OF PROXIMAL PHALANX OF LEFT INDEX FINGER, INITIAL ENCOUNTER FOR CLOSED FRACTURE: ICD-10-CM

## 2021-12-16 PROCEDURE — 99999 PR PBB SHADOW E&M-EST. PATIENT-LVL III: CPT | Mod: PBBFAC,,, | Performed by: ORTHOPAEDIC SURGERY

## 2021-12-16 PROCEDURE — 99203 PR OFFICE/OUTPT VISIT, NEW, LEVL III, 30-44 MIN: ICD-10-PCS | Mod: 57,S$GLB,, | Performed by: ORTHOPAEDIC SURGERY

## 2021-12-16 PROCEDURE — 99203 OFFICE O/P NEW LOW 30 MIN: CPT | Mod: 57,S$GLB,, | Performed by: ORTHOPAEDIC SURGERY

## 2021-12-16 PROCEDURE — 26725 PR CLOSE RX PROX/MID FING SHFT FX,MANIP: ICD-10-PCS | Mod: F1,S$GLB,, | Performed by: ORTHOPAEDIC SURGERY

## 2021-12-16 PROCEDURE — 99999 PR PBB SHADOW E&M-EST. PATIENT-LVL III: ICD-10-PCS | Mod: PBBFAC,,, | Performed by: ORTHOPAEDIC SURGERY

## 2021-12-16 PROCEDURE — 26725 TREAT FINGER FRACTURE EACH: CPT | Mod: F1,S$GLB,, | Performed by: ORTHOPAEDIC SURGERY

## 2021-12-29 ENCOUNTER — TELEPHONE (OUTPATIENT)
Dept: ORTHOPEDICS | Facility: CLINIC | Age: 86
End: 2021-12-29
Payer: MEDICARE

## 2021-12-29 DIAGNOSIS — S62.611A DISPLACED FRACTURE OF PROXIMAL PHALANX OF LEFT INDEX FINGER, INITIAL ENCOUNTER FOR CLOSED FRACTURE: Primary | ICD-10-CM

## 2021-12-30 ENCOUNTER — HOSPITAL ENCOUNTER (OUTPATIENT)
Dept: RADIOLOGY | Facility: HOSPITAL | Age: 86
Discharge: HOME | End: 2021-12-30
Attending: ORTHOPAEDIC SURGERY
Payer: MEDICARE

## 2021-12-30 ENCOUNTER — OFFICE VISIT (OUTPATIENT)
Dept: ORTHOPEDICS | Facility: CLINIC | Age: 86
End: 2021-12-30
Payer: MEDICARE

## 2021-12-30 VITALS — HEIGHT: 64 IN | BODY MASS INDEX: 29.89 KG/M2 | WEIGHT: 175.06 LBS

## 2021-12-30 DIAGNOSIS — S62.611A DISPLACED FRACTURE OF PROXIMAL PHALANX OF LEFT INDEX FINGER, INITIAL ENCOUNTER FOR CLOSED FRACTURE: ICD-10-CM

## 2021-12-30 DIAGNOSIS — S62.611D CLOSED DISPLACED FRACTURE OF PROXIMAL PHALANX OF LEFT INDEX FINGER WITH ROUTINE HEALING: Primary | ICD-10-CM

## 2021-12-30 PROCEDURE — 1160F RVW MEDS BY RX/DR IN RCRD: CPT | Mod: CPTII,S$GLB,, | Performed by: ORTHOPAEDIC SURGERY

## 2021-12-30 PROCEDURE — 1126F AMNT PAIN NOTED NONE PRSNT: CPT | Mod: CPTII,S$GLB,, | Performed by: ORTHOPAEDIC SURGERY

## 2021-12-30 PROCEDURE — 73130 X-RAY EXAM OF HAND: CPT | Mod: TC,FY,LT

## 2021-12-30 PROCEDURE — 99999 PR PBB SHADOW E&M-EST. PATIENT-LVL III: CPT | Mod: PBBFAC,,, | Performed by: ORTHOPAEDIC SURGERY

## 2021-12-30 PROCEDURE — 1159F MED LIST DOCD IN RCRD: CPT | Mod: CPTII,S$GLB,, | Performed by: ORTHOPAEDIC SURGERY

## 2021-12-30 PROCEDURE — 1160F PR REVIEW ALL MEDS BY PRESCRIBER/CLIN PHARMACIST DOCUMENTED: ICD-10-PCS | Mod: CPTII,S$GLB,, | Performed by: ORTHOPAEDIC SURGERY

## 2021-12-30 PROCEDURE — 73130 XR HAND COMPLETE 3 VIEW LEFT: ICD-10-PCS | Mod: 26,LT,, | Performed by: RADIOLOGY

## 2021-12-30 PROCEDURE — 1100F PTFALLS ASSESS-DOCD GE2>/YR: CPT | Mod: CPTII,S$GLB,, | Performed by: ORTHOPAEDIC SURGERY

## 2021-12-30 PROCEDURE — 99024 PR POST-OP FOLLOW-UP VISIT: ICD-10-PCS | Mod: S$GLB,,, | Performed by: ORTHOPAEDIC SURGERY

## 2021-12-30 PROCEDURE — 1100F PR PT FALLS ASSESS DOC 2+ FALLS/FALL W/INJURY/YR: ICD-10-PCS | Mod: CPTII,S$GLB,, | Performed by: ORTHOPAEDIC SURGERY

## 2021-12-30 PROCEDURE — 73130 X-RAY EXAM OF HAND: CPT | Mod: 26,LT,, | Performed by: RADIOLOGY

## 2021-12-30 PROCEDURE — 99024 POSTOP FOLLOW-UP VISIT: CPT | Mod: S$GLB,,, | Performed by: ORTHOPAEDIC SURGERY

## 2021-12-30 PROCEDURE — 3288F PR FALLS RISK ASSESSMENT DOCUMENTED: ICD-10-PCS | Mod: CPTII,S$GLB,, | Performed by: ORTHOPAEDIC SURGERY

## 2021-12-30 PROCEDURE — 3288F FALL RISK ASSESSMENT DOCD: CPT | Mod: CPTII,S$GLB,, | Performed by: ORTHOPAEDIC SURGERY

## 2021-12-30 PROCEDURE — 1126F PR PAIN SEVERITY QUANTIFIED, NO PAIN PRESENT: ICD-10-PCS | Mod: CPTII,S$GLB,, | Performed by: ORTHOPAEDIC SURGERY

## 2021-12-30 PROCEDURE — 99999 PR PBB SHADOW E&M-EST. PATIENT-LVL III: ICD-10-PCS | Mod: PBBFAC,,, | Performed by: ORTHOPAEDIC SURGERY

## 2021-12-30 PROCEDURE — 1159F PR MEDICATION LIST DOCUMENTED IN MEDICAL RECORD: ICD-10-PCS | Mod: CPTII,S$GLB,, | Performed by: ORTHOPAEDIC SURGERY

## 2022-01-10 ENCOUNTER — TELEPHONE (OUTPATIENT)
Dept: ORTHOPEDICS | Facility: CLINIC | Age: 87
End: 2022-01-10
Payer: MEDICARE

## 2022-01-10 DIAGNOSIS — S62.611D CLOSED DISPLACED FRACTURE OF PROXIMAL PHALANX OF LEFT INDEX FINGER WITH ROUTINE HEALING, SUBSEQUENT ENCOUNTER: Primary | ICD-10-CM

## 2022-01-11 ENCOUNTER — OFFICE VISIT (OUTPATIENT)
Dept: ORTHOPEDICS | Facility: CLINIC | Age: 87
End: 2022-01-11
Payer: MEDICARE

## 2022-01-11 ENCOUNTER — HOSPITAL ENCOUNTER (OUTPATIENT)
Dept: RADIOLOGY | Facility: HOSPITAL | Age: 87
Discharge: HOME | End: 2022-01-11
Attending: ORTHOPAEDIC SURGERY
Payer: MEDICARE

## 2022-01-11 DIAGNOSIS — S62.611D CLOSED DISPLACED FRACTURE OF PROXIMAL PHALANX OF LEFT INDEX FINGER WITH ROUTINE HEALING, SUBSEQUENT ENCOUNTER: ICD-10-CM

## 2022-01-11 DIAGNOSIS — S62.611D CLOSED DISPLACED FRACTURE OF PROXIMAL PHALANX OF LEFT INDEX FINGER WITH ROUTINE HEALING, SUBSEQUENT ENCOUNTER: Primary | ICD-10-CM

## 2022-01-11 PROCEDURE — 99024 PR POST-OP FOLLOW-UP VISIT: ICD-10-PCS | Mod: S$GLB,,, | Performed by: ORTHOPAEDIC SURGERY

## 2022-01-11 PROCEDURE — 1100F PTFALLS ASSESS-DOCD GE2>/YR: CPT | Mod: CPTII,S$GLB,, | Performed by: ORTHOPAEDIC SURGERY

## 2022-01-11 PROCEDURE — 73130 X-RAY EXAM OF HAND: CPT | Mod: TC,FY,LT

## 2022-01-11 PROCEDURE — 1159F PR MEDICATION LIST DOCUMENTED IN MEDICAL RECORD: ICD-10-PCS | Mod: CPTII,S$GLB,, | Performed by: ORTHOPAEDIC SURGERY

## 2022-01-11 PROCEDURE — 99999 PR PBB SHADOW E&M-EST. PATIENT-LVL III: ICD-10-PCS | Mod: PBBFAC,,, | Performed by: ORTHOPAEDIC SURGERY

## 2022-01-11 PROCEDURE — 3288F PR FALLS RISK ASSESSMENT DOCUMENTED: ICD-10-PCS | Mod: CPTII,S$GLB,, | Performed by: ORTHOPAEDIC SURGERY

## 2022-01-11 PROCEDURE — 73140 X-RAY EXAM OF FINGER(S): CPT | Mod: 26,XS,LT, | Performed by: RADIOLOGY

## 2022-01-11 PROCEDURE — 3288F FALL RISK ASSESSMENT DOCD: CPT | Mod: CPTII,S$GLB,, | Performed by: ORTHOPAEDIC SURGERY

## 2022-01-11 PROCEDURE — 99999 PR PBB SHADOW E&M-EST. PATIENT-LVL III: CPT | Mod: PBBFAC,,, | Performed by: ORTHOPAEDIC SURGERY

## 2022-01-11 PROCEDURE — 73130 XR HAND COMPLETE 3 VIEW LEFT: ICD-10-PCS | Mod: 26,LT,, | Performed by: RADIOLOGY

## 2022-01-11 PROCEDURE — 73140 X-RAY EXAM OF FINGER(S): CPT | Mod: TC,FY

## 2022-01-11 PROCEDURE — 99024 POSTOP FOLLOW-UP VISIT: CPT | Mod: S$GLB,,, | Performed by: ORTHOPAEDIC SURGERY

## 2022-01-11 PROCEDURE — 1160F RVW MEDS BY RX/DR IN RCRD: CPT | Mod: CPTII,S$GLB,, | Performed by: ORTHOPAEDIC SURGERY

## 2022-01-11 PROCEDURE — 1100F PR PT FALLS ASSESS DOC 2+ FALLS/FALL W/INJURY/YR: ICD-10-PCS | Mod: CPTII,S$GLB,, | Performed by: ORTHOPAEDIC SURGERY

## 2022-01-11 PROCEDURE — 73140 XR FINGER 2 OR MORE VIEWS: ICD-10-PCS | Mod: 26,XS,LT, | Performed by: RADIOLOGY

## 2022-01-11 PROCEDURE — 73130 X-RAY EXAM OF HAND: CPT | Mod: 26,LT,, | Performed by: RADIOLOGY

## 2022-01-11 PROCEDURE — 1159F MED LIST DOCD IN RCRD: CPT | Mod: CPTII,S$GLB,, | Performed by: ORTHOPAEDIC SURGERY

## 2022-01-11 PROCEDURE — 1160F PR REVIEW ALL MEDS BY PRESCRIBER/CLIN PHARMACIST DOCUMENTED: ICD-10-PCS | Mod: CPTII,S$GLB,, | Performed by: ORTHOPAEDIC SURGERY

## 2022-01-11 NOTE — PROGRESS NOTES
Subjective:      Patient ID: Lisa Sepulveda is a 86 y.o. female.    Chief Complaint: Injury of the Left Hand (Left index finger fx)      HPI: Lisa Sepulveda is follow-up of displaced proximal finger fracture of the left index finger which was reduced and splinted in clinic 3.5 weeks ago. She has been in the splint since then. She still has significant swelling made worse by the splint. But otherwise no complaints today.      Past Medical History:   Diagnosis Date    Aortic aneurysm     Arthritis     Hypertension        Current Outpatient Medications:     albuterol (PROVENTIL/VENTOLIN HFA) 90 mcg/actuation inhaler, Inhale 1-2 puffs into the lungs every 6 (six) hours as needed for Wheezing. Rescue, Disp: 18 g, Rfl: 11    aspirin 81 MG Chew, Take 81 mg by mouth once daily., Disp: , Rfl:     labetalol (NORMODYNE) 100 MG tablet, Take 100 mg by mouth. Take 1/2 tablet twice daily, Disp: , Rfl:     NIFEdipine (PROCARDIA-XL) 60 MG (OSM) 24 hr tablet, TAKE ONE TABLET BY MOUTH ONCE DAILY, Disp: 90 tablet, Rfl: 3    rosuvastatin (CRESTOR) 20 MG tablet, Take 1 tablet (20 mg total) by mouth every evening., Disp: 90 tablet, Rfl: 3    SIMBRINZA 1-0.2 % DrpS, , Disp: , Rfl:     timolol maleate 0.5% (TIMOPTIC) 0.5 % Drop, , Disp: , Rfl:   Review of patient's allergies indicates:  No Known Allergies    There were no vitals taken for this visit.    Review of Systems   Constitutional: Negative for chills and fever.   Cardiovascular: Negative for chest pain and palpitations.   Respiratory: Negative for shortness of breath and wheezing.    Skin: Negative for poor wound healing and rash.   Musculoskeletal: Positive for joint pain, joint swelling and stiffness.   Gastrointestinal: Negative for nausea and vomiting.   Genitourinary: Negative for dysuria and hematuria.   Neurological: Negative for seizures and tremors.   Psychiatric/Behavioral: Negative for altered mental status.   Allergic/Immunologic: Negative for  environmental allergies and persistent infections.         Objective:    Ortho Exam       Left hand UE: Skin intact. Swelling moderate, over the MCP and the index finger. TTP of the global index finger. ROM 0 deg of movement given pain. No malrotation Sensation intact. Tinels negative. Pulses present. Cap refill birsk.   GEN: Well developed, well nourished female. AAOX3. No acute distress.   Normocephalic, atraumatic.   MARCY  Breathing unlabored.  Mood and affect appropriate.       Assessment:     Imaging:  I have personally reviewed and interpreted the radiographs. Xray of the left hand shows proximal phalanx fracture is better anatomic alignment. Healing        1. Closed displaced fracture of proximal phalanx of left index finger with routine healing, subsequent encounter          Plan:       I explained the nature of the fracture  It is better aligned at this point and healing, but not healed yet  Xrays reviewed with Dr. Pinto   Recommend buddy taping, gentle ROM, no lifting   OTC pain control as needed    Orders Placed This Encounter    X-Ray Finger 2 or More Views     Follow up in about 3 weeks (around 2/1/2022).

## 2022-02-03 DIAGNOSIS — S62.611D CLOSED DISPLACED FRACTURE OF PROXIMAL PHALANX OF LEFT INDEX FINGER WITH ROUTINE HEALING, SUBSEQUENT ENCOUNTER: Primary | ICD-10-CM

## 2022-02-03 NOTE — PROGRESS NOTES
"  Subjective:      Patient ID: Lisa Sepulveda is a 86 y.o. female.    Chief Complaint: Fracture (Left Index finger fracture follow up)      HPI: Lisa Sepulveda is follow-up of displaced proximal finger fracture of the left index finger which was treated non-operatively with immobilization. At her last visit she was advanced to buddy taping. She is now about 7 weeks out from initial injury. Reports "soreness" but no significant pain. States she has been using her hand for washing dishes and light activity without much difficulty.     Past Medical History:   Diagnosis Date    Aortic aneurysm     Arthritis     Hypertension        Current Outpatient Medications:     albuterol (PROVENTIL/VENTOLIN HFA) 90 mcg/actuation inhaler, Inhale 1-2 puffs into the lungs every 6 (six) hours as needed for Wheezing. Rescue, Disp: 18 g, Rfl: 11    aspirin 81 MG Chew, Take 81 mg by mouth once daily., Disp: , Rfl:     labetalol (NORMODYNE) 100 MG tablet, Take 100 mg by mouth. Take 1/2 tablet twice daily, Disp: , Rfl:     NIFEdipine (PROCARDIA-XL) 60 MG (OSM) 24 hr tablet, TAKE ONE TABLET BY MOUTH ONCE DAILY, Disp: 90 tablet, Rfl: 3    rosuvastatin (CRESTOR) 20 MG tablet, Take 1 tablet (20 mg total) by mouth every evening., Disp: 90 tablet, Rfl: 3    SIMBRINZA 1-0.2 % DrpS, , Disp: , Rfl:     timolol maleate 0.5% (TIMOPTIC) 0.5 % Drop, , Disp: , Rfl:   Review of patient's allergies indicates:  No Known Allergies    Ht 5' 4" (1.626 m)   Wt 79.4 kg (175 lb)   BMI 30.04 kg/m²     Review of Systems   Constitutional: Negative for chills and fever.   Cardiovascular: Negative for chest pain and palpitations.   Respiratory: Negative for shortness of breath and wheezing.    Skin: Negative for poor wound healing and rash.   Musculoskeletal: Positive for joint pain, joint swelling and stiffness.   Gastrointestinal: Negative for nausea and vomiting.   Genitourinary: Negative for dysuria and hematuria.   Neurological: Negative for " seizures and tremors.   Psychiatric/Behavioral: Negative for altered mental status.   Allergic/Immunologic: Negative for environmental allergies and persistent infections.         Objective:    Ortho Exam       Left hand UE: Skin intact. Swelling moderate. TTP + PIP joint ROM limited in extension of the PIP, 25deg. Flexion painful. No malrotation Sensation intact. Tinels negative. Pulses present. Cap refill birsk.   GEN: Well developed, well nourished female. AAOX3. No acute distress.   Normocephalic, atraumatic.   MARCY  Breathing unlabored.  Mood and affect appropriate.       Assessment:     Imaging:  I have personally reviewed and interpreted the radiographs. Xray of the left hand shows proximal phalanx fracture is unchanged from prior        1. Closed displaced fracture of proximal phalanx of left index finger with routine healing, subsequent encounter          Plan:       Fracture is stable  She does have a flexion contracture and painful flexion. I recommended formal hand therapy; she declined. She would like to try exercises at home instead. These were explained and demonstrated  Warm water soaks  She states she understands she may not have full motion of her finger and this does not bother her; however she would like it to be painless       Follow up in about 3 weeks (around 2/25/2022).

## 2022-02-04 ENCOUNTER — HOSPITAL ENCOUNTER (OUTPATIENT)
Dept: RADIOLOGY | Facility: HOSPITAL | Age: 87
Discharge: HOME | End: 2022-02-04
Attending: ORTHOPAEDIC SURGERY
Payer: MEDICARE

## 2022-02-04 ENCOUNTER — OFFICE VISIT (OUTPATIENT)
Dept: ORTHOPEDICS | Facility: CLINIC | Age: 87
End: 2022-02-04
Payer: MEDICARE

## 2022-02-04 VITALS — WEIGHT: 175 LBS | HEIGHT: 64 IN | BODY MASS INDEX: 29.88 KG/M2

## 2022-02-04 DIAGNOSIS — S62.611D CLOSED DISPLACED FRACTURE OF PROXIMAL PHALANX OF LEFT INDEX FINGER WITH ROUTINE HEALING, SUBSEQUENT ENCOUNTER: Primary | ICD-10-CM

## 2022-02-04 DIAGNOSIS — S62.611D CLOSED DISPLACED FRACTURE OF PROXIMAL PHALANX OF LEFT INDEX FINGER WITH ROUTINE HEALING, SUBSEQUENT ENCOUNTER: ICD-10-CM

## 2022-02-04 PROCEDURE — 73140 X-RAY EXAM OF FINGER(S): CPT | Mod: TC,PN,LT

## 2022-02-04 PROCEDURE — 3288F FALL RISK ASSESSMENT DOCD: CPT | Mod: CPTII,S$GLB,, | Performed by: ORTHOPAEDIC SURGERY

## 2022-02-04 PROCEDURE — 1125F AMNT PAIN NOTED PAIN PRSNT: CPT | Mod: CPTII,S$GLB,, | Performed by: ORTHOPAEDIC SURGERY

## 2022-02-04 PROCEDURE — 1159F PR MEDICATION LIST DOCUMENTED IN MEDICAL RECORD: ICD-10-PCS | Mod: CPTII,S$GLB,, | Performed by: ORTHOPAEDIC SURGERY

## 2022-02-04 PROCEDURE — 99024 PR POST-OP FOLLOW-UP VISIT: ICD-10-PCS | Mod: S$GLB,,, | Performed by: ORTHOPAEDIC SURGERY

## 2022-02-04 PROCEDURE — 1100F PR PT FALLS ASSESS DOC 2+ FALLS/FALL W/INJURY/YR: ICD-10-PCS | Mod: CPTII,S$GLB,, | Performed by: ORTHOPAEDIC SURGERY

## 2022-02-04 PROCEDURE — 99999 PR PBB SHADOW E&M-EST. PATIENT-LVL III: ICD-10-PCS | Mod: PBBFAC,,, | Performed by: ORTHOPAEDIC SURGERY

## 2022-02-04 PROCEDURE — 73140 XR FINGER 2 OR MORE VIEWS LEFT: ICD-10-PCS | Mod: 26,LT,, | Performed by: RADIOLOGY

## 2022-02-04 PROCEDURE — 99999 PR PBB SHADOW E&M-EST. PATIENT-LVL III: CPT | Mod: PBBFAC,,, | Performed by: ORTHOPAEDIC SURGERY

## 2022-02-04 PROCEDURE — 1125F PR PAIN SEVERITY QUANTIFIED, PAIN PRESENT: ICD-10-PCS | Mod: CPTII,S$GLB,, | Performed by: ORTHOPAEDIC SURGERY

## 2022-02-04 PROCEDURE — 1159F MED LIST DOCD IN RCRD: CPT | Mod: CPTII,S$GLB,, | Performed by: ORTHOPAEDIC SURGERY

## 2022-02-04 PROCEDURE — 73140 X-RAY EXAM OF FINGER(S): CPT | Mod: 26,LT,, | Performed by: RADIOLOGY

## 2022-02-04 PROCEDURE — 1100F PTFALLS ASSESS-DOCD GE2>/YR: CPT | Mod: CPTII,S$GLB,, | Performed by: ORTHOPAEDIC SURGERY

## 2022-02-04 PROCEDURE — 3288F PR FALLS RISK ASSESSMENT DOCUMENTED: ICD-10-PCS | Mod: CPTII,S$GLB,, | Performed by: ORTHOPAEDIC SURGERY

## 2022-02-04 PROCEDURE — 99024 POSTOP FOLLOW-UP VISIT: CPT | Mod: S$GLB,,, | Performed by: ORTHOPAEDIC SURGERY

## 2022-03-03 DIAGNOSIS — S62.611D CLOSED DISPLACED FRACTURE OF PROXIMAL PHALANX OF LEFT INDEX FINGER WITH ROUTINE HEALING, SUBSEQUENT ENCOUNTER: Primary | ICD-10-CM

## 2022-03-04 ENCOUNTER — HOSPITAL ENCOUNTER (OUTPATIENT)
Dept: RADIOLOGY | Facility: HOSPITAL | Age: 87
Discharge: HOME | End: 2022-03-04
Attending: ORTHOPAEDIC SURGERY
Payer: MEDICARE

## 2022-03-04 ENCOUNTER — OFFICE VISIT (OUTPATIENT)
Dept: ORTHOPEDICS | Facility: CLINIC | Age: 87
End: 2022-03-04
Payer: MEDICARE

## 2022-03-04 VITALS — BODY MASS INDEX: 29.88 KG/M2 | HEIGHT: 64 IN | WEIGHT: 175 LBS

## 2022-03-04 DIAGNOSIS — S62.611D CLOSED DISPLACED FRACTURE OF PROXIMAL PHALANX OF LEFT INDEX FINGER WITH ROUTINE HEALING, SUBSEQUENT ENCOUNTER: ICD-10-CM

## 2022-03-04 DIAGNOSIS — S62.611D CLOSED DISPLACED FRACTURE OF PROXIMAL PHALANX OF LEFT INDEX FINGER WITH ROUTINE HEALING, SUBSEQUENT ENCOUNTER: Primary | ICD-10-CM

## 2022-03-04 PROCEDURE — 1126F AMNT PAIN NOTED NONE PRSNT: CPT | Mod: CPTII,S$GLB,, | Performed by: ORTHOPAEDIC SURGERY

## 2022-03-04 PROCEDURE — 3288F FALL RISK ASSESSMENT DOCD: CPT | Mod: CPTII,S$GLB,, | Performed by: ORTHOPAEDIC SURGERY

## 2022-03-04 PROCEDURE — 1159F MED LIST DOCD IN RCRD: CPT | Mod: CPTII,S$GLB,, | Performed by: ORTHOPAEDIC SURGERY

## 2022-03-04 PROCEDURE — 99024 PR POST-OP FOLLOW-UP VISIT: ICD-10-PCS | Mod: S$GLB,,, | Performed by: ORTHOPAEDIC SURGERY

## 2022-03-04 PROCEDURE — 99024 POSTOP FOLLOW-UP VISIT: CPT | Mod: S$GLB,,, | Performed by: ORTHOPAEDIC SURGERY

## 2022-03-04 PROCEDURE — 73140 X-RAY EXAM OF FINGER(S): CPT | Mod: TC,PN,LT

## 2022-03-04 PROCEDURE — 1101F PT FALLS ASSESS-DOCD LE1/YR: CPT | Mod: CPTII,S$GLB,, | Performed by: ORTHOPAEDIC SURGERY

## 2022-03-04 PROCEDURE — 99999 PR PBB SHADOW E&M-EST. PATIENT-LVL III: ICD-10-PCS | Mod: PBBFAC,,, | Performed by: ORTHOPAEDIC SURGERY

## 2022-03-04 PROCEDURE — 3288F PR FALLS RISK ASSESSMENT DOCUMENTED: ICD-10-PCS | Mod: CPTII,S$GLB,, | Performed by: ORTHOPAEDIC SURGERY

## 2022-03-04 PROCEDURE — 73140 X-RAY EXAM OF FINGER(S): CPT | Mod: 26,LT,, | Performed by: RADIOLOGY

## 2022-03-04 PROCEDURE — 1126F PR PAIN SEVERITY QUANTIFIED, NO PAIN PRESENT: ICD-10-PCS | Mod: CPTII,S$GLB,, | Performed by: ORTHOPAEDIC SURGERY

## 2022-03-04 PROCEDURE — 1101F PR PT FALLS ASSESS DOC 0-1 FALLS W/OUT INJ PAST YR: ICD-10-PCS | Mod: CPTII,S$GLB,, | Performed by: ORTHOPAEDIC SURGERY

## 2022-03-04 PROCEDURE — 1159F PR MEDICATION LIST DOCUMENTED IN MEDICAL RECORD: ICD-10-PCS | Mod: CPTII,S$GLB,, | Performed by: ORTHOPAEDIC SURGERY

## 2022-03-04 PROCEDURE — 99999 PR PBB SHADOW E&M-EST. PATIENT-LVL III: CPT | Mod: PBBFAC,,, | Performed by: ORTHOPAEDIC SURGERY

## 2022-03-04 PROCEDURE — 73140 XR FINGER 2 OR MORE VIEWS LEFT: ICD-10-PCS | Mod: 26,LT,, | Performed by: RADIOLOGY

## 2022-03-04 NOTE — PROGRESS NOTES
"  Subjective:      Patient ID: Lisa Sepulveda is a 86 y.o. female.    Chief Complaint: Follow-up (1 month follow up left index finger fracture.)      HPI: Lisa Sepulveda is follow-up of displaced proximal finger fracture of the left index finger which was treated non-operatively with immobilization. She is now about 3 months out from injury. Reports still having PIP stiffness and pain, but overall the finger is much better. ADLs are not difficult.     Past Medical History:   Diagnosis Date    Aortic aneurysm     Arthritis     Hypertension        Current Outpatient Medications:     albuterol (PROVENTIL/VENTOLIN HFA) 90 mcg/actuation inhaler, Inhale 1-2 puffs into the lungs every 6 (six) hours as needed for Wheezing. Rescue, Disp: 18 g, Rfl: 11    aspirin 81 MG Chew, Take 81 mg by mouth once daily., Disp: , Rfl:     labetalol (NORMODYNE) 100 MG tablet, Take 100 mg by mouth. Take 1/2 tablet twice daily, Disp: , Rfl:     NIFEdipine (PROCARDIA-XL) 60 MG (OSM) 24 hr tablet, TAKE ONE TABLET BY MOUTH ONCE DAILY, Disp: 90 tablet, Rfl: 3    rosuvastatin (CRESTOR) 20 MG tablet, TAKE 1 TABLET BY MOUTH ONCE DAILY IN THE EVENING, Disp: 90 tablet, Rfl: 3    SIMBRINZA 1-0.2 % DrpS, , Disp: , Rfl:     timolol maleate 0.5% (TIMOPTIC) 0.5 % Drop, , Disp: , Rfl:   Review of patient's allergies indicates:  No Known Allergies    Ht 5' 4" (1.626 m)   Wt 79.4 kg (175 lb)   BMI 30.04 kg/m²     Review of Systems   Constitutional: Negative for chills and fever.   Cardiovascular: Negative for chest pain and palpitations.   Respiratory: Negative for shortness of breath and wheezing.    Skin: Negative for poor wound healing and rash.   Musculoskeletal: Positive for joint pain, joint swelling and stiffness.   Gastrointestinal: Negative for nausea and vomiting.   Genitourinary: Negative for dysuria and hematuria.   Neurological: Negative for seizures and tremors.   Psychiatric/Behavioral: Negative for altered mental status. "   Allergic/Immunologic: Negative for environmental allergies and persistent infections.         Objective:    Ortho Exam       Left hand UE: Skin intact. Swelling mild. TTP + PIP joint ROM limited, felxion contracture improved, and ROM improved No malrotation Sensation intact. Tinels negative. Pulses present. Cap refill birsk.   GEN: Well developed, well nourished female. AAOX3. No acute distress.   Normocephalic, atraumatic.   MARCY  Breathing unlabored.  Mood and affect appropriate.       Assessment:     Imaging:  I have personally reviewed and interpreted the radiographs. Xray of the left hand shows healed proximal phalanx fracture.        1. Closed displaced fracture of proximal phalanx of left index finger with routine healing, subsequent encounter          Plan:       Fracture is healed  ROM/ contracture improved  PIP joint still with some stiffness and pain, she has not really been doing her warm water soaks or exercise but will start  Overall, he hand is functional and pain is tolerable. Pt is pleased with outcome.        Follow up if symptoms worsen or fail to improve.

## 2022-04-01 ENCOUNTER — LAB VISIT (OUTPATIENT)
Dept: LAB | Facility: HOSPITAL | Age: 87
End: 2022-04-01
Attending: INTERNAL MEDICINE
Payer: MEDICARE

## 2022-04-01 DIAGNOSIS — I71.03 DISSECTION OF THORACOABDOMINAL AORTIC ANEURYSM (TAAA): ICD-10-CM

## 2022-04-01 DIAGNOSIS — I10 ESSENTIAL HYPERTENSION: ICD-10-CM

## 2022-04-01 DIAGNOSIS — I71.019 DISSECTION OF THORACIC AORTA: ICD-10-CM

## 2022-04-01 DIAGNOSIS — Z98.890 HX OF REPAIR OF DISSECTING THORACIC ANEURYSM: ICD-10-CM

## 2022-04-01 DIAGNOSIS — I11.9 HYPERTENSIVE HEART DISEASE WITHOUT HEART FAILURE: ICD-10-CM

## 2022-04-01 DIAGNOSIS — Z86.79 HX OF REPAIR OF DISSECTING THORACIC ANEURYSM: ICD-10-CM

## 2022-04-01 LAB
ALBUMIN SERPL BCP-MCNC: 4 G/DL (ref 3.5–5.2)
ALP SERPL-CCNC: 58 U/L (ref 55–135)
ALT SERPL W/O P-5'-P-CCNC: 13 U/L (ref 10–44)
ANION GAP SERPL CALC-SCNC: 10 MMOL/L (ref 8–16)
AST SERPL-CCNC: 23 U/L (ref 10–40)
BASOPHILS # BLD AUTO: 0.05 K/UL (ref 0–0.2)
BASOPHILS NFR BLD: 1 % (ref 0–1.9)
BILIRUB SERPL-MCNC: 0.4 MG/DL (ref 0.1–1)
BUN SERPL-MCNC: 11 MG/DL (ref 8–23)
CALCIUM SERPL-MCNC: 9.5 MG/DL (ref 8.7–10.5)
CHLORIDE SERPL-SCNC: 107 MMOL/L (ref 95–110)
CHOLEST SERPL-MCNC: 135 MG/DL (ref 120–199)
CHOLEST/HDLC SERPL: 2.3 {RATIO} (ref 2–5)
CO2 SERPL-SCNC: 25 MMOL/L (ref 23–29)
CREAT SERPL-MCNC: 0.9 MG/DL (ref 0.5–1.4)
DIFFERENTIAL METHOD: ABNORMAL
EOSINOPHIL # BLD AUTO: 0.2 K/UL (ref 0–0.5)
EOSINOPHIL NFR BLD: 3.3 % (ref 0–8)
ERYTHROCYTE [DISTWIDTH] IN BLOOD BY AUTOMATED COUNT: 15.2 % (ref 11.5–14.5)
EST. GFR  (AFRICAN AMERICAN): >60 ML/MIN/1.73 M^2
EST. GFR  (NON AFRICAN AMERICAN): 59 ML/MIN/1.73 M^2
GLUCOSE SERPL-MCNC: 81 MG/DL (ref 70–110)
HCT VFR BLD AUTO: 42.4 % (ref 37–48.5)
HDLC SERPL-MCNC: 59 MG/DL (ref 40–75)
HDLC SERPL: 43.7 % (ref 20–50)
HGB BLD-MCNC: 13.8 G/DL (ref 12–16)
IMM GRANULOCYTES # BLD AUTO: 0.01 K/UL (ref 0–0.04)
IMM GRANULOCYTES NFR BLD AUTO: 0.2 % (ref 0–0.5)
LDLC SERPL CALC-MCNC: 62.6 MG/DL (ref 63–159)
LYMPHOCYTES # BLD AUTO: 2 K/UL (ref 1–4.8)
LYMPHOCYTES NFR BLD: 38.4 % (ref 18–48)
MCH RBC QN AUTO: 29.1 PG (ref 27–31)
MCHC RBC AUTO-ENTMCNC: 32.5 G/DL (ref 32–36)
MCV RBC AUTO: 89 FL (ref 82–98)
MONOCYTES # BLD AUTO: 0.4 K/UL (ref 0.3–1)
MONOCYTES NFR BLD: 7.8 % (ref 4–15)
NEUTROPHILS # BLD AUTO: 2.6 K/UL (ref 1.8–7.7)
NEUTROPHILS NFR BLD: 49.3 % (ref 38–73)
NONHDLC SERPL-MCNC: 76 MG/DL
NRBC BLD-RTO: 0 /100 WBC
PLATELET # BLD AUTO: 211 K/UL (ref 150–450)
PMV BLD AUTO: 11.9 FL (ref 9.2–12.9)
POTASSIUM SERPL-SCNC: 3.9 MMOL/L (ref 3.5–5.1)
PROT SERPL-MCNC: 7.7 G/DL (ref 6–8.4)
RBC # BLD AUTO: 4.75 M/UL (ref 4–5.4)
SODIUM SERPL-SCNC: 142 MMOL/L (ref 136–145)
TRIGL SERPL-MCNC: 67 MG/DL (ref 30–150)
TSH SERPL DL<=0.005 MIU/L-ACNC: 2.42 UIU/ML (ref 0.4–4)
WBC # BLD AUTO: 5.23 K/UL (ref 3.9–12.7)

## 2022-04-01 PROCEDURE — 84443 ASSAY THYROID STIM HORMONE: CPT | Performed by: INTERNAL MEDICINE

## 2022-04-01 PROCEDURE — 80061 LIPID PANEL: CPT | Performed by: INTERNAL MEDICINE

## 2022-04-01 PROCEDURE — 36415 COLL VENOUS BLD VENIPUNCTURE: CPT | Performed by: INTERNAL MEDICINE

## 2022-04-01 PROCEDURE — 80053 COMPREHEN METABOLIC PANEL: CPT | Performed by: INTERNAL MEDICINE

## 2022-04-01 PROCEDURE — 85025 COMPLETE CBC W/AUTO DIFF WBC: CPT | Performed by: INTERNAL MEDICINE

## 2022-04-05 ENCOUNTER — OFFICE VISIT (OUTPATIENT)
Dept: CARDIOLOGY | Facility: CLINIC | Age: 87
End: 2022-04-05
Payer: MEDICARE

## 2022-04-05 VITALS
HEIGHT: 64 IN | OXYGEN SATURATION: 97 % | DIASTOLIC BLOOD PRESSURE: 73 MMHG | WEIGHT: 163.38 LBS | HEART RATE: 74 BPM | BODY MASS INDEX: 27.89 KG/M2 | SYSTOLIC BLOOD PRESSURE: 114 MMHG

## 2022-04-05 DIAGNOSIS — R79.89 ELEVATED TROPONIN: ICD-10-CM

## 2022-04-05 DIAGNOSIS — I10 ESSENTIAL HYPERTENSION: ICD-10-CM

## 2022-04-05 DIAGNOSIS — I71.03 DISSECTION OF THORACOABDOMINAL AORTIC ANEURYSM (TAAA): ICD-10-CM

## 2022-04-05 DIAGNOSIS — Z98.890 HX OF REPAIR OF DISSECTING THORACIC ANEURYSM: ICD-10-CM

## 2022-04-05 DIAGNOSIS — I51.7 LVH (LEFT VENTRICULAR HYPERTROPHY): ICD-10-CM

## 2022-04-05 DIAGNOSIS — I71.10 RUPTURED ANEURYSM OF THORACIC AORTA: ICD-10-CM

## 2022-04-05 DIAGNOSIS — Z86.79 HX OF REPAIR OF DISSECTING THORACIC ANEURYSM: ICD-10-CM

## 2022-04-05 DIAGNOSIS — I71.019 DISSECTION OF THORACIC AORTA: Primary | ICD-10-CM

## 2022-04-05 PROCEDURE — 3074F SYST BP LT 130 MM HG: CPT | Mod: CPTII,S$GLB,, | Performed by: INTERNAL MEDICINE

## 2022-04-05 PROCEDURE — 3078F DIAST BP <80 MM HG: CPT | Mod: CPTII,S$GLB,, | Performed by: INTERNAL MEDICINE

## 2022-04-05 PROCEDURE — 3288F PR FALLS RISK ASSESSMENT DOCUMENTED: ICD-10-PCS | Mod: CPTII,S$GLB,, | Performed by: INTERNAL MEDICINE

## 2022-04-05 PROCEDURE — 93000 EKG 12-LEAD: ICD-10-PCS | Mod: S$GLB,,, | Performed by: INTERNAL MEDICINE

## 2022-04-05 PROCEDURE — 1101F PR PT FALLS ASSESS DOC 0-1 FALLS W/OUT INJ PAST YR: ICD-10-PCS | Mod: CPTII,S$GLB,, | Performed by: INTERNAL MEDICINE

## 2022-04-05 PROCEDURE — 1126F PR PAIN SEVERITY QUANTIFIED, NO PAIN PRESENT: ICD-10-PCS | Mod: CPTII,S$GLB,, | Performed by: INTERNAL MEDICINE

## 2022-04-05 PROCEDURE — 99214 OFFICE O/P EST MOD 30 MIN: CPT | Mod: 25,S$GLB,, | Performed by: INTERNAL MEDICINE

## 2022-04-05 PROCEDURE — 3078F PR MOST RECENT DIASTOLIC BLOOD PRESSURE < 80 MM HG: ICD-10-PCS | Mod: CPTII,S$GLB,, | Performed by: INTERNAL MEDICINE

## 2022-04-05 PROCEDURE — 1159F PR MEDICATION LIST DOCUMENTED IN MEDICAL RECORD: ICD-10-PCS | Mod: CPTII,S$GLB,, | Performed by: INTERNAL MEDICINE

## 2022-04-05 PROCEDURE — 1126F AMNT PAIN NOTED NONE PRSNT: CPT | Mod: CPTII,S$GLB,, | Performed by: INTERNAL MEDICINE

## 2022-04-05 PROCEDURE — 1159F MED LIST DOCD IN RCRD: CPT | Mod: CPTII,S$GLB,, | Performed by: INTERNAL MEDICINE

## 2022-04-05 PROCEDURE — 3074F PR MOST RECENT SYSTOLIC BLOOD PRESSURE < 130 MM HG: ICD-10-PCS | Mod: CPTII,S$GLB,, | Performed by: INTERNAL MEDICINE

## 2022-04-05 PROCEDURE — 1101F PT FALLS ASSESS-DOCD LE1/YR: CPT | Mod: CPTII,S$GLB,, | Performed by: INTERNAL MEDICINE

## 2022-04-05 PROCEDURE — 1160F PR REVIEW ALL MEDS BY PRESCRIBER/CLIN PHARMACIST DOCUMENTED: ICD-10-PCS | Mod: CPTII,S$GLB,, | Performed by: INTERNAL MEDICINE

## 2022-04-05 PROCEDURE — 99214 PR OFFICE/OUTPT VISIT, EST, LEVL IV, 30-39 MIN: ICD-10-PCS | Mod: 25,S$GLB,, | Performed by: INTERNAL MEDICINE

## 2022-04-05 PROCEDURE — 3288F FALL RISK ASSESSMENT DOCD: CPT | Mod: CPTII,S$GLB,, | Performed by: INTERNAL MEDICINE

## 2022-04-05 PROCEDURE — 99999 PR PBB SHADOW E&M-EST. PATIENT-LVL III: CPT | Mod: PBBFAC,,, | Performed by: INTERNAL MEDICINE

## 2022-04-05 PROCEDURE — 99999 PR PBB SHADOW E&M-EST. PATIENT-LVL III: ICD-10-PCS | Mod: PBBFAC,,, | Performed by: INTERNAL MEDICINE

## 2022-04-05 PROCEDURE — 93000 ELECTROCARDIOGRAM COMPLETE: CPT | Mod: S$GLB,,, | Performed by: INTERNAL MEDICINE

## 2022-04-05 PROCEDURE — 1160F RVW MEDS BY RX/DR IN RCRD: CPT | Mod: CPTII,S$GLB,, | Performed by: INTERNAL MEDICINE

## 2022-04-05 NOTE — PROGRESS NOTES
Subjective:      Patient ID: Lisa Sepulveda is a 86 y.o. female.    Chief Complaint: Follow-up    HPI:   Feels well    Had all of her teeth removed    Eats soft food    Tripped on uneven sidewalk in December and broke finger.    Review of Systems   Cardiovascular: Negative for chest pain, claudication, dyspnea on exertion, irregular heartbeat, leg swelling, near-syncope, orthopnea, palpitations and syncope.        Past Medical History:   Diagnosis Date    Aortic aneurysm     Arthritis     Hypertension         Past Surgical History:   Procedure Laterality Date    ABDOMINAL SURGERY      THORACIC AORTIC ANEURYSM REPAIR         Family History   Problem Relation Age of Onset    Hypertension Father     Stroke Father     Diabetes Sister     Diabetes Sister        Social History     Socioeconomic History    Marital status: Single   Tobacco Use    Smoking status: Never Smoker    Smokeless tobacco: Never Used   Substance and Sexual Activity    Alcohol use: No    Drug use: No    Sexual activity: Never       Current Outpatient Medications on File Prior to Visit   Medication Sig Dispense Refill    albuterol (PROVENTIL/VENTOLIN HFA) 90 mcg/actuation inhaler Inhale 1-2 puffs into the lungs every 6 (six) hours as needed for Wheezing. Rescue 18 g 11    aspirin 81 MG Chew Take 81 mg by mouth once daily.      labetalol (NORMODYNE) 100 MG tablet Take 100 mg by mouth. Take 1/2 tablet twice daily      NIFEdipine (PROCARDIA-XL) 60 MG (OSM) 24 hr tablet TAKE ONE TABLET BY MOUTH ONCE DAILY 90 tablet 3    rosuvastatin (CRESTOR) 20 MG tablet TAKE 1 TABLET BY MOUTH ONCE DAILY IN THE EVENING 90 tablet 3    SIMBRINZA 1-0.2 % DrpS       timolol maleate 0.5% (TIMOPTIC) 0.5 % Drop        No current facility-administered medications on file prior to visit.       Review of patient's allergies indicates:  No Known Allergies  Objective:     Vitals:    04/05/22 1518   BP: 114/73   BP Location: Left arm   Patient Position:  "Sitting   BP Method: Large (Automatic)   Pulse: 74   SpO2: 97%   Weight: 74.1 kg (163 lb 5.8 oz)   Height: 5' 4" (1.626 m)        Physical Exam  Vitals reviewed.   Constitutional:       Appearance: She is well-developed.   Eyes:      General: No scleral icterus.  Neck:      Vascular: No carotid bruit or JVD.   Cardiovascular:      Rate and Rhythm: Normal rate and regular rhythm.      Pulses:           Radial pulses are 2+ on the right side and 2+ on the left side.        Dorsalis pedis pulses are 2+ on the right side and 2+ on the left side.      Heart sounds: No murmur heard.    No gallop.   Pulmonary:      Breath sounds: Normal breath sounds.   Musculoskeletal:      Right lower leg: No edema.      Left lower leg: No edema.   Skin:     General: Skin is warm and dry.   Neurological:      Mental Status: She is alert and oriented to person, place, and time.   Psychiatric:         Behavior: Behavior normal.         Thought Content: Thought content normal.         Judgment: Judgment normal.            ECG today reviewed by me: NSR, inverted T waves in inferior and anterolateral leads,unchanged compared with the last tracing       12/23/2020 Routine     Narrative & Impression  EXAMINATION:  CTA CHEST ABDOMEN NON CORONARY (XPD)     CLINICAL HISTORY:  Thoracic aortic dissection, known, follow up;  Dissection of thoracic aorta     TECHNIQUE:  CT examination of the chest and abdomen was performed via aortic aneurysm protocol after the administration of 100 mL intravenous contrast, Omnipaque 350.  Precontrast axial imaging of the chest and abdomen was performed, arterial phase postcontrast imaging of the chest and abdomen, and delayed imaging of the abdomen was performed.  Sagittal and coronal reconstruction imaging and axial MIP reconstruction imaging is submitted.     COMPARISON:  CTA chest and abdomen May 16, 2017     FINDINGS:  There is a heterogeneous nodule of the left thyroid lobe, best appreciated on series 2 axial " image 31 measuring approximately 10.5 mm, if previously unknown and if clinically warranted ultrasound follow-up is recommended.  Just posterior to the left thyroid lobe there is an area of a mixed attenuation character, including air and some mild dense material, this measures up to approximately 1.7 cm in size, and appears to have been present on multiple prior examinations, may relate to a laryngocele or a esophageal diverticulum.  It measures mildly more prominent in size having measured approximately 1.2 cm on the prior exam.     Findings consistent with thoracic aortic aneurysm endovascular stent repair/placement again noted, the thoracic aorta demonstrates appropriate opacification, the endovascular stent appears stable when compared to the prior exam.  The ascending thoracic aorta measures approximately 4.5 cm anterior to posterior dimension when measured at a similar level to the prior examination in which it measured 4.3 cm.  The descending thoracic aorta along the course of the endovascular stent measures up to approximately 3.6 cm anterior to posterior dimension, compared to approximately 3.5 cm when measured at a similar level on the prior exam.  The distal descending thoracic aorta below the level of the endovascular stent measures approximately 2.7 cm, compared to 2.7 cm on the prior exam.  There is no evidence for aneurysmal dilatation of the abdominal aorta, the abdominal aorta demonstrates appropriate opacification.  There is no evidence for thoracic or abdominal aortic dissection, acute leak or periaortic hematoma.     There is a short segment dissection seen involving the right subclavian artery as best seen on series 3 axial images 36 through 49, over the course of approximately 3.5 cm, there appears to be opacification of the true and the false lumen.  The visualized great vessels of the head and neck otherwise demonstrate appropriate opacification, tortuosity is noted.     The celiac artery,  hepatic artery, splenic artery, and superior mesenteric artery demonstrate appropriate opacification, atherosclerotic changes noted.  The renal arteries bilaterally demonstrate appropriate opacification, atherosclerotic change noted without high-grade stenosis.  As on the prior examination there is an aneurysm arising from the superior margin of the proximal aspect of the left renal artery near its origin, this appears at the level of the origin of what appears to be a left adrenal artery, as well as what appears to be a spinal artery extending in a curvilinear fashion posteriorly.  This is difficult to accurately measure due to its configuration however appears stable when comparison to axial imaging, sagittal and coronal reconstruction imaging without a large degree of interval change.  The visualized iliac arterial vascular demonstrate appropriate opacification, atherosclerotic changes noted.     The lungs demonstrate mild motion artifact, there is atelectatic change, mild ground-glass density may relate to mild ground-glass infiltrate although could relate to areas of diminished depth of inspiration.  There is no enlarged mediastinal or hilar adenopathy.  Coronary arterial atherosclerotic changes noted.  There is no pericardial effusion, there is no pleural effusion, there is no hemopericardium and no hemothorax.  There is no pneumothorax.     There is appearance suggesting a small hiatal hernia, the stomach is decompressed.  There is no evidence for acute process of the gallbladder, pancreas, spleen, or adrenal glands.  Hypodense lesion of the left lobe of the liver again noted, not completely evaluated on this examination however appears stable.  Renal hypodensities are again noted, small hypodensity at the upper pole of the right kidney, too small for characterization appears stable, somewhat bilobed cysts of the left kidney again noted, these are at the mid to lower pole, the more prominent has increased in  size now measuring up to approximately 7.9 cm, the less prominent inferiorly mildly increased in size measuring up to 5.9 cm.  The superior aspect of the urinary bladder appears distended otherwise unremarkable.  There is a small fat density anterior abdominal wall hernia incompletely imaged likely umbilical hernia.  There is no evidence for bowel inflammatory or obstructive process.  The appendix is identified and does not appear inflamed.  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  There is no evidence for free intraperitoneal air.     Note is made of intravenous contrast extravasation of the right upper extremity.  The visualized osseous structures demonstrate chronic change.  There is diminished mineralization and degenerative change noted.     Impression:     Thoracic aortic aneurysm again noted, with endovascular stent repair again noted, as detailed above.     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     Interval development of relatively short segment approximately 3.5 cm dissection involving the right subclavian artery, as discussed above.     10.5 mm left thyroid nodule, if previously unknown and if clinically warranted ultrasound follow-up can be done for characterization.     Intravenous contrast examination at the level of the right upper extremity injection site.     1.7 cm finding of the left neck may represent laryngocels or esophageal diverticulum, seen on multiple prior examinations, mildly more prominent in size previously measuring 1.2 cm.     Renal cysts are again noted, mild increased in size when compared to the prior study.     Additional findings are noted as above.     This report was flagged in Epic as abnormal.        Electronically signed by: Enmanuel Mendoza  Date:                                            12/23/2020  Time:                                            09:40             Exam Ended: 12/23/20 08:22               Lab Visit on 04/01/2022   Component Date Value Ref Range Status    WBC 04/01/2022 5.23  3.90 - 12.70 K/uL Final    RBC 04/01/2022 4.75  4.00 - 5.40 M/uL Final    Hemoglobin 04/01/2022 13.8  12.0 - 16.0 g/dL Final    Hematocrit 04/01/2022 42.4  37.0 - 48.5 % Final    MCV 04/01/2022 89  82 - 98 fL Final    MCH 04/01/2022 29.1  27.0 - 31.0 pg Final    MCHC 04/01/2022 32.5  32.0 - 36.0 g/dL Final    RDW 04/01/2022 15.2 (A) 11.5 - 14.5 % Final    Platelets 04/01/2022 211  150 - 450 K/uL Final    MPV 04/01/2022 11.9  9.2 - 12.9 fL Final    Immature Granulocytes 04/01/2022 0.2  0.0 - 0.5 % Final    Gran # (ANC) 04/01/2022 2.6  1.8 - 7.7 K/uL Final    Immature Grans (Abs) 04/01/2022 0.01  0.00 - 0.04 K/uL Final    Lymph # 04/01/2022 2.0  1.0 - 4.8 K/uL Final    Mono # 04/01/2022 0.4  0.3 - 1.0 K/uL Final    Eos # 04/01/2022 0.2  0.0 - 0.5 K/uL Final    Baso # 04/01/2022 0.05  0.00 - 0.20 K/uL Final    nRBC 04/01/2022 0  0 /100 WBC Final    Gran % 04/01/2022 49.3  38.0 - 73.0 % Final    Lymph % 04/01/2022 38.4  18.0 - 48.0 % Final    Mono % 04/01/2022 7.8  4.0 - 15.0 % Final    Eosinophil % 04/01/2022 3.3  0.0 - 8.0 % Final    Basophil % 04/01/2022 1.0  0.0 - 1.9 % Final    Differential Method 04/01/2022 Automated   Final    Sodium 04/01/2022 142  136 - 145 mmol/L Final    Potassium 04/01/2022 3.9  3.5 - 5.1 mmol/L Final    Chloride 04/01/2022 107  95 - 110 mmol/L Final    CO2 04/01/2022 25  23 - 29 mmol/L Final    Glucose 04/01/2022 81  70 - 110 mg/dL Final    BUN 04/01/2022 11  8 - 23 mg/dL Final    Creatinine 04/01/2022 0.9  0.5 - 1.4 mg/dL Final    Calcium 04/01/2022 9.5  8.7 - 10.5 mg/dL Final    Total Protein 04/01/2022 7.7  6.0 - 8.4 g/dL Final    Albumin 04/01/2022 4.0  3.5 - 5.2 g/dL Final    Total Bilirubin 04/01/2022 0.4  0.1 - 1.0 mg/dL Final    Alkaline Phosphatase 04/01/2022  58  55 - 135 U/L Final    AST 04/01/2022 23  10 - 40 U/L Final    ALT 04/01/2022 13  10 - 44 U/L Final    Anion Gap 04/01/2022 10  8 - 16 mmol/L Final    eGFR if African American 04/01/2022 >60  >60 mL/min/1.73 m^2 Final    eGFR if non African American 04/01/2022 59 (A) >60 mL/min/1.73 m^2 Final    Cholesterol 04/01/2022 135  120 - 199 mg/dL Final    Triglycerides 04/01/2022 67  30 - 150 mg/dL Final    HDL 04/01/2022 59  40 - 75 mg/dL Final    LDL Cholesterol 04/01/2022 62.6 (A) 63.0 - 159.0 mg/dL Final    HDL/Cholesterol Ratio 04/01/2022 43.7  20.0 - 50.0 % Final    Total Cholesterol/HDL Ratio 04/01/2022 2.3  2.0 - 5.0 Final    Non-HDL Cholesterol 04/01/2022 76  mg/dL Final    TSH 04/01/2022 2.419  0.400 - 4.000 uIU/mL Final   (  Accession #: [id number]    Transthoracic echo (TTE) complete  Order# [id number]  Reading physician: Yang Pereira MD Ordering physician: Yang Pereira MD Study date: 12/23/20       Reason for Exam  Priority: Routine  Dx: Dissection of thoracic aorta [I71.01 (ICD-10-CM)]; Near syncope [R55 (ICD-10-CM)]     Result Image Hyperlink     Show images for Echo Color Flow Doppler? Yes    Summary    · The left ventricle is normal in size with concentric remodeling and normal systolic function. The estimated ejection fraction is 70%  · Grade I left ventricular diastolic dysfunction.  · Mild left atrial enlargement.  · Normal right ventricular size with normal right ventricular systolic function.  · Mild mitral regurgitation.  · Normal central venous pressure (3 mmHg).  · The estimated PA systolic pressure is 17 mmHg.       Accession #: [id number]    Lisa Selma  Holter monitor - 48 hour  Order# [id number]  Reading physician: Yang Pereira MD Ordering physician: Yang Pereira MD Study date: 12/23/20       Reason for Exam  Priority: Routine  Dx: Dissection of thoracic aorta [I71.01 (ICD-10-CM)]; Near syncope [R55 (ICD-10-CM)]     Conclusion    · Normal sinus rhythm  with a heart rate variable between 52 and 130 bpm, averaging 78 bpm  · One single PVC was observed  · There are frequent PAC's including 562 PAC couplets and several PAC triplets and rare episodes of ectopic atrial tachycardia up to 4 beats in duration at rates up to 197 bpm         Performing Clinician    Michelle Eastman         Assessment:     1. Dissection of thoracic aorta    2. Elevated troponin    3. Essential hypertension    4. Hx of repair of dissecting thoracic aneurysm    5. LVH (left ventricular hypertrophy)    6. Ruptured aneurysm of thoracic aorta    7. Dissecting aortic aneurysm (any part), thoracoabdominal      Plan:   Lisa was seen today for follow-up.    Diagnoses and all orders for this visit:    Dissection of thoracic aorta  -     IN OFFICE EKG 12-LEAD (to Muse)    Elevated troponin  -     IN OFFICE EKG 12-LEAD (to Muse)    Essential hypertension  -     IN OFFICE EKG 12-LEAD (to Muse)    Hx of repair of dissecting thoracic aneurysm  -     IN OFFICE EKG 12-LEAD (to Muse)    LVH (left ventricular hypertrophy)  -     IN OFFICE EKG 12-LEAD (to Muse)    Ruptured aneurysm of thoracic aorta  -     IN OFFICE EKG 12-LEAD (to Muse)    Dissecting aortic aneurysm (any part), thoracoabdominal  -     IN OFFICE EKG 12-LEAD (to Muse)     HBP and HLD are well controlled.    S/P thoracic aortic aneurysm repair--stable    Same meds    RTC 6 months with lab    Follow up in about 6 months (around 10/5/2022).

## 2022-10-18 ENCOUNTER — OFFICE VISIT (OUTPATIENT)
Dept: CARDIOLOGY | Facility: CLINIC | Age: 87
End: 2022-10-18
Payer: MEDICARE

## 2022-10-18 VITALS
WEIGHT: 166.13 LBS | HEIGHT: 64 IN | HEART RATE: 77 BPM | OXYGEN SATURATION: 98 % | SYSTOLIC BLOOD PRESSURE: 112 MMHG | DIASTOLIC BLOOD PRESSURE: 72 MMHG | BODY MASS INDEX: 28.36 KG/M2

## 2022-10-18 DIAGNOSIS — I77.79: ICD-10-CM

## 2022-10-18 DIAGNOSIS — Z86.79 HX OF REPAIR OF DISSECTING THORACIC ANEURYSM: ICD-10-CM

## 2022-10-18 DIAGNOSIS — I71.10 RUPTURED ANEURYSM OF THORACIC AORTA, UNSPECIFIED PART: ICD-10-CM

## 2022-10-18 DIAGNOSIS — I10 ESSENTIAL HYPERTENSION: ICD-10-CM

## 2022-10-18 DIAGNOSIS — I71.03 DISSECTION OF THORACOABDOMINAL AORTIC ANEURYSM (TAAA): Primary | ICD-10-CM

## 2022-10-18 DIAGNOSIS — Z98.890 HX OF REPAIR OF DISSECTING THORACIC ANEURYSM: ICD-10-CM

## 2022-10-18 DIAGNOSIS — I11.9 HYPERTENSIVE HEART DISEASE WITHOUT HEART FAILURE: ICD-10-CM

## 2022-10-18 PROCEDURE — 1159F MED LIST DOCD IN RCRD: CPT | Mod: CPTII,S$GLB,, | Performed by: INTERNAL MEDICINE

## 2022-10-18 PROCEDURE — 1101F PR PT FALLS ASSESS DOC 0-1 FALLS W/OUT INJ PAST YR: ICD-10-PCS | Mod: CPTII,S$GLB,, | Performed by: INTERNAL MEDICINE

## 2022-10-18 PROCEDURE — 1160F PR REVIEW ALL MEDS BY PRESCRIBER/CLIN PHARMACIST DOCUMENTED: ICD-10-PCS | Mod: CPTII,S$GLB,, | Performed by: INTERNAL MEDICINE

## 2022-10-18 PROCEDURE — 3288F FALL RISK ASSESSMENT DOCD: CPT | Mod: CPTII,S$GLB,, | Performed by: INTERNAL MEDICINE

## 2022-10-18 PROCEDURE — 3078F PR MOST RECENT DIASTOLIC BLOOD PRESSURE < 80 MM HG: ICD-10-PCS | Mod: CPTII,S$GLB,, | Performed by: INTERNAL MEDICINE

## 2022-10-18 PROCEDURE — 93000 ELECTROCARDIOGRAM COMPLETE: CPT | Mod: S$GLB,,, | Performed by: INTERNAL MEDICINE

## 2022-10-18 PROCEDURE — 99999 PR PBB SHADOW E&M-EST. PATIENT-LVL III: ICD-10-PCS | Mod: PBBFAC,,, | Performed by: INTERNAL MEDICINE

## 2022-10-18 PROCEDURE — 1160F RVW MEDS BY RX/DR IN RCRD: CPT | Mod: CPTII,S$GLB,, | Performed by: INTERNAL MEDICINE

## 2022-10-18 PROCEDURE — 3074F PR MOST RECENT SYSTOLIC BLOOD PRESSURE < 130 MM HG: ICD-10-PCS | Mod: CPTII,S$GLB,, | Performed by: INTERNAL MEDICINE

## 2022-10-18 PROCEDURE — 3288F PR FALLS RISK ASSESSMENT DOCUMENTED: ICD-10-PCS | Mod: CPTII,S$GLB,, | Performed by: INTERNAL MEDICINE

## 2022-10-18 PROCEDURE — 1126F PR PAIN SEVERITY QUANTIFIED, NO PAIN PRESENT: ICD-10-PCS | Mod: CPTII,S$GLB,, | Performed by: INTERNAL MEDICINE

## 2022-10-18 PROCEDURE — 93000 EKG 12-LEAD: ICD-10-PCS | Mod: S$GLB,,, | Performed by: INTERNAL MEDICINE

## 2022-10-18 PROCEDURE — 1159F PR MEDICATION LIST DOCUMENTED IN MEDICAL RECORD: ICD-10-PCS | Mod: CPTII,S$GLB,, | Performed by: INTERNAL MEDICINE

## 2022-10-18 PROCEDURE — 3078F DIAST BP <80 MM HG: CPT | Mod: CPTII,S$GLB,, | Performed by: INTERNAL MEDICINE

## 2022-10-18 PROCEDURE — 99999 PR PBB SHADOW E&M-EST. PATIENT-LVL III: CPT | Mod: PBBFAC,,, | Performed by: INTERNAL MEDICINE

## 2022-10-18 PROCEDURE — 99213 OFFICE O/P EST LOW 20 MIN: CPT | Mod: 25,S$GLB,, | Performed by: INTERNAL MEDICINE

## 2022-10-18 PROCEDURE — 3074F SYST BP LT 130 MM HG: CPT | Mod: CPTII,S$GLB,, | Performed by: INTERNAL MEDICINE

## 2022-10-18 PROCEDURE — 1126F AMNT PAIN NOTED NONE PRSNT: CPT | Mod: CPTII,S$GLB,, | Performed by: INTERNAL MEDICINE

## 2022-10-18 PROCEDURE — 1101F PT FALLS ASSESS-DOCD LE1/YR: CPT | Mod: CPTII,S$GLB,, | Performed by: INTERNAL MEDICINE

## 2022-10-18 PROCEDURE — 99213 PR OFFICE/OUTPT VISIT, EST, LEVL III, 20-29 MIN: ICD-10-PCS | Mod: 25,S$GLB,, | Performed by: INTERNAL MEDICINE

## 2022-10-18 NOTE — PROGRESS NOTES
Subjective:      Patient ID: Lisa Sepulveda is a 87 y.o. female.    Chief Complaint: Follow-up    HPI:  No recurrent falls    Feels well    Washes dishes    Sits outside     Shop sometimes    Review of Systems   Cardiovascular:  Negative for chest pain, claudication, dyspnea on exertion, irregular heartbeat, leg swelling, near-syncope, orthopnea, palpitations and syncope.      Past Medical History:   Diagnosis Date    Aortic aneurysm     Arthritis     Hypertension         Past Surgical History:   Procedure Laterality Date    ABDOMINAL SURGERY      THORACIC AORTIC ANEURYSM REPAIR         Family History   Problem Relation Age of Onset    Hypertension Father     Stroke Father     Diabetes Sister     Diabetes Sister        Social History     Socioeconomic History    Marital status: Single   Tobacco Use    Smoking status: Never    Smokeless tobacco: Never   Substance and Sexual Activity    Alcohol use: No    Drug use: No    Sexual activity: Never       Current Outpatient Medications on File Prior to Visit   Medication Sig Dispense Refill    albuterol (PROVENTIL/VENTOLIN HFA) 90 mcg/actuation inhaler Inhale 1-2 puffs into the lungs every 6 (six) hours as needed for Wheezing. Rescue 18 g 11    aspirin 81 MG Chew Take 81 mg by mouth once daily.      labetalol (NORMODYNE) 100 MG tablet Take 100 mg by mouth. Take 1/2 tablet twice daily      NIFEdipine (PROCARDIA-XL) 60 MG (OSM) 24 hr tablet TAKE ONE TABLET BY MOUTH ONCE DAILY 90 tablet 3    rosuvastatin (CRESTOR) 20 MG tablet TAKE 1 TABLET BY MOUTH ONCE DAILY IN THE EVENING 90 tablet 3    SIMBRINZA 1-0.2 % DrpS       timolol maleate 0.5% (TIMOPTIC) 0.5 % Drop        No current facility-administered medications on file prior to visit.       Review of patient's allergies indicates:  No Known Allergies  Objective:     Vitals:    10/18/22 1452   BP: 112/72   BP Location: Left arm   Patient Position: Sitting   BP Method: Large (Automatic)   Pulse: 77   SpO2: 98%   Weight: 75.4  "kg (166 lb 1.9 oz)   Height: 5' 4" (1.626 m)        Physical Exam  Vitals reviewed.   Constitutional:       Appearance: She is well-developed.   Eyes:      General: No scleral icterus.  Neck:      Vascular: No carotid bruit or JVD.   Cardiovascular:      Rate and Rhythm: Normal rate and regular rhythm.      Heart sounds: No murmur heard.    No gallop.   Pulmonary:      Breath sounds: Normal breath sounds.   Musculoskeletal:      Right lower leg: No edema.      Left lower leg: No edema.   Skin:     General: Skin is warm and dry.   Neurological:      Mental Status: She is alert and oriented to person, place, and time.   Psychiatric:         Behavior: Behavior normal.         Thought Content: Thought content normal.         Judgment: Judgment normal.          ECG today: NSR, LVH, inverted T waves in inferior and lateral leads, reviewed by me, unchanged      No visits with results within 6 Month(s) from this visit.   Latest known visit with results is:   Lab Visit on 04/01/2022   Component Date Value Ref Range Status    WBC 04/01/2022 5.23  3.90 - 12.70 K/uL Final    RBC 04/01/2022 4.75  4.00 - 5.40 M/uL Final    Hemoglobin 04/01/2022 13.8  12.0 - 16.0 g/dL Final    Hematocrit 04/01/2022 42.4  37.0 - 48.5 % Final    MCV 04/01/2022 89  82 - 98 fL Final    MCH 04/01/2022 29.1  27.0 - 31.0 pg Final    MCHC 04/01/2022 32.5  32.0 - 36.0 g/dL Final    RDW 04/01/2022 15.2 (H)  11.5 - 14.5 % Final    Platelets 04/01/2022 211  150 - 450 K/uL Final    MPV 04/01/2022 11.9  9.2 - 12.9 fL Final    Immature Granulocytes 04/01/2022 0.2  0.0 - 0.5 % Final    Gran # (ANC) 04/01/2022 2.6  1.8 - 7.7 K/uL Final    Immature Grans (Abs) 04/01/2022 0.01  0.00 - 0.04 K/uL Final    Lymph # 04/01/2022 2.0  1.0 - 4.8 K/uL Final    Mono # 04/01/2022 0.4  0.3 - 1.0 K/uL Final    Eos # 04/01/2022 0.2  0.0 - 0.5 K/uL Final    Baso # 04/01/2022 0.05  0.00 - 0.20 K/uL Final    nRBC 04/01/2022 0  0 /100 WBC Final    Gran % 04/01/2022 49.3  38.0 - 73.0 % " Final    Lymph % 04/01/2022 38.4  18.0 - 48.0 % Final    Mono % 04/01/2022 7.8  4.0 - 15.0 % Final    Eosinophil % 04/01/2022 3.3  0.0 - 8.0 % Final    Basophil % 04/01/2022 1.0  0.0 - 1.9 % Final    Differential Method 04/01/2022 Automated   Final    Sodium 04/01/2022 142  136 - 145 mmol/L Final    Potassium 04/01/2022 3.9  3.5 - 5.1 mmol/L Final    Chloride 04/01/2022 107  95 - 110 mmol/L Final    CO2 04/01/2022 25  23 - 29 mmol/L Final    Glucose 04/01/2022 81  70 - 110 mg/dL Final    BUN 04/01/2022 11  8 - 23 mg/dL Final    Creatinine 04/01/2022 0.9  0.5 - 1.4 mg/dL Final    Calcium 04/01/2022 9.5  8.7 - 10.5 mg/dL Final    Total Protein 04/01/2022 7.7  6.0 - 8.4 g/dL Final    Albumin 04/01/2022 4.0  3.5 - 5.2 g/dL Final    Total Bilirubin 04/01/2022 0.4  0.1 - 1.0 mg/dL Final    Alkaline Phosphatase 04/01/2022 58  55 - 135 U/L Final    AST 04/01/2022 23  10 - 40 U/L Final    ALT 04/01/2022 13  10 - 44 U/L Final    Anion Gap 04/01/2022 10  8 - 16 mmol/L Final    eGFR if African American 04/01/2022 >60  >60 mL/min/1.73 m^2 Final    eGFR if non African American 04/01/2022 59 (A)  >60 mL/min/1.73 m^2 Final    Cholesterol 04/01/2022 135  120 - 199 mg/dL Final    Triglycerides 04/01/2022 67  30 - 150 mg/dL Final    HDL 04/01/2022 59  40 - 75 mg/dL Final    LDL Cholesterol 04/01/2022 62.6 (L)  63.0 - 159.0 mg/dL Final    HDL/Cholesterol Ratio 04/01/2022 43.7  20.0 - 50.0 % Final    Total Cholesterol/HDL Ratio 04/01/2022 2.3  2.0 - 5.0 Final    Non-HDL Cholesterol 04/01/2022 76  mg/dL Final    TSH 04/01/2022 2.419  0.400 - 4.000 uIU/mL Final   (  Accession #: [id number]    Lisa Sepulveda  Holter monitor - 48 hour  Order# [id number]  Reading physician: Yang Pereira MD Ordering physician: Yang Pereira MD Study date: 12/23/20       Reason for Exam  Priority: Routine  Dx: Dissection of thoracic aorta [I71.01 (ICD-10-CM)]; Near syncope [R55 (ICD-10-CM)]     Conclusion    Normal sinus rhythm with a heart rate  variable between 52 and 130 bpm, averaging 78 bpm  One single PVC was observed  There are frequent PAC's including 562 PAC couplets and several PAC triplets and rare episodes of ectopic atrial tachycardia up to 4 beats in duration at rates up to 197 bpm         Performing Clinician    Michelle Eastman     Accession #: [id number]    Transthoracic echo (TTE) complete  Order# [id number]  Reading physician: Yang Pereira MD Ordering physician: Yang Pereira MD Study date: 12/23/20       Reason for Exam  Priority: Routine  Dx: Dissection of thoracic aorta [I71.01 (ICD-10-CM)]; Near syncope [R55 (ICD-10-CM)]     Result Image Hyperlink     Show images for Echo Color Flow Doppler? Yes    Summary    The left ventricle is normal in size with concentric remodeling and normal systolic function. The estimated ejection fraction is 70%  Grade I left ventricular diastolic dysfunction.  Mild left atrial enlargement.  Normal right ventricular size with normal right ventricular systolic function.  Mild mitral regurgitation.  Normal central venous pressure (3 mmHg).  The estimated PA systolic pressure is 17 mmHg.      12/23/2020 Routine     Narrative & Impression  EXAMINATION:  CTA CHEST ABDOMEN NON CORONARY (XPD)     CLINICAL HISTORY:  Thoracic aortic dissection, known, follow up;  Dissection of thoracic aorta     TECHNIQUE:  CT examination of the chest and abdomen was performed via aortic aneurysm protocol after the administration of 100 mL intravenous contrast, Omnipaque 350.  Precontrast axial imaging of the chest and abdomen was performed, arterial phase postcontrast imaging of the chest and abdomen, and delayed imaging of the abdomen was performed.  Sagittal and coronal reconstruction imaging and axial MIP reconstruction imaging is submitted.     COMPARISON:  CTA chest and abdomen May 16, 2017     FINDINGS:  There is a heterogeneous nodule of the left thyroid lobe, best appreciated on series 2 axial image 31 measuring  approximately 10.5 mm, if previously unknown and if clinically warranted ultrasound follow-up is recommended.  Just posterior to the left thyroid lobe there is an area of a mixed attenuation character, including air and some mild dense material, this measures up to approximately 1.7 cm in size, and appears to have been present on multiple prior examinations, may relate to a laryngocele or a esophageal diverticulum.  It measures mildly more prominent in size having measured approximately 1.2 cm on the prior exam.     Findings consistent with thoracic aortic aneurysm endovascular stent repair/placement again noted, the thoracic aorta demonstrates appropriate opacification, the endovascular stent appears stable when compared to the prior exam.  The ascending thoracic aorta measures approximately 4.5 cm anterior to posterior dimension when measured at a similar level to the prior examination in which it measured 4.3 cm.  The descending thoracic aorta along the course of the endovascular stent measures up to approximately 3.6 cm anterior to posterior dimension, compared to approximately 3.5 cm when measured at a similar level on the prior exam.  The distal descending thoracic aorta below the level of the endovascular stent measures approximately 2.7 cm, compared to 2.7 cm on the prior exam.  There is no evidence for aneurysmal dilatation of the abdominal aorta, the abdominal aorta demonstrates appropriate opacification.  There is no evidence for thoracic or abdominal aortic dissection, acute leak or periaortic hematoma.     There is a short segment dissection seen involving the right subclavian artery as best seen on series 3 axial images 36 through 49, over the course of approximately 3.5 cm, there appears to be opacification of the true and the false lumen.  The visualized great vessels of the head and neck otherwise demonstrate appropriate opacification, tortuosity is noted.     The celiac artery, hepatic artery,  splenic artery, and superior mesenteric artery demonstrate appropriate opacification, atherosclerotic changes noted.  The renal arteries bilaterally demonstrate appropriate opacification, atherosclerotic change noted without high-grade stenosis.  As on the prior examination there is an aneurysm arising from the superior margin of the proximal aspect of the left renal artery near its origin, this appears at the level of the origin of what appears to be a left adrenal artery, as well as what appears to be a spinal artery extending in a curvilinear fashion posteriorly.  This is difficult to accurately measure due to its configuration however appears stable when comparison to axial imaging, sagittal and coronal reconstruction imaging without a large degree of interval change.  The visualized iliac arterial vascular demonstrate appropriate opacification, atherosclerotic changes noted.     The lungs demonstrate mild motion artifact, there is atelectatic change, mild ground-glass density may relate to mild ground-glass infiltrate although could relate to areas of diminished depth of inspiration.  There is no enlarged mediastinal or hilar adenopathy.  Coronary arterial atherosclerotic changes noted.  There is no pericardial effusion, there is no pleural effusion, there is no hemopericardium and no hemothorax.  There is no pneumothorax.     There is appearance suggesting a small hiatal hernia, the stomach is decompressed.  There is no evidence for acute process of the gallbladder, pancreas, spleen, or adrenal glands.  Hypodense lesion of the left lobe of the liver again noted, not completely evaluated on this examination however appears stable.  Renal hypodensities are again noted, small hypodensity at the upper pole of the right kidney, too small for characterization appears stable, somewhat bilobed cysts of the left kidney again noted, these are at the mid to lower pole, the more prominent has increased in size now  measuring up to approximately 7.9 cm, the less prominent inferiorly mildly increased in size measuring up to 5.9 cm.  The superior aspect of the urinary bladder appears distended otherwise unremarkable.  There is a small fat density anterior abdominal wall hernia incompletely imaged likely umbilical hernia.  There is no evidence for bowel inflammatory or obstructive process.  The appendix is identified and does not appear inflamed.  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  There is no evidence for free intraperitoneal air.     Note is made of intravenous contrast extravasation of the right upper extremity.  The visualized osseous structures demonstrate chronic change.  There is diminished mineralization and degenerative change noted.     Impression:     Thoracic aortic aneurysm again noted, with endovascular stent repair again noted, as detailed above.     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     Interval development of relatively short segment approximately 3.5 cm dissection involving the right subclavian artery, as discussed above.     10.5 mm left thyroid nodule, if previously unknown and if clinically warranted ultrasound follow-up can be done for characterization.     Intravenous contrast examination at the level of the right upper extremity injection site.     1.7 cm finding of the left neck may represent laryngocels or esophageal diverticulum, seen on multiple prior examinations, mildly more prominent in size previously measuring 1.2 cm.     Renal cysts are again noted, mild increased in size when compared to the prior study.     Additional findings are noted as above.     This report was flagged in Epic as abnormal.        Electronically signed by: Enmanuel Mendoza  Date:                                            12/23/2020  Time:                                            09:40             Exam Ended: 12/23/20 08:22           Assessment:     1. Dissecting aortic aneurysm (any part), thoracoabdominal    2. Essential hypertension    3. Hx of repair of dissecting thoracic aneurysm    4. Hypertensive heart disease without heart failure    5. Ruptured aneurysm of thoracic aorta, unspecified part    6. Dissection of right subclavian artery      Plan:   Lisa was seen today for follow-up.    Diagnoses and all orders for this visit:    Dissecting aortic aneurysm (any part), thoracoabdominal  -     IN OFFICE EKG 12-LEAD (to Muse)    Essential hypertension  -     IN OFFICE EKG 12-LEAD (to Muse)    Hx of repair of dissecting thoracic aneurysm  -     IN OFFICE EKG 12-LEAD (to [place])    Hypertensive heart disease without heart failure  -     IN OFFICE EKG 12-LEAD (to Muse)    Ruptured aneurysm of thoracic aorta, unspecified part  -     IN OFFICE EKG 12-LEAD (to Muse)    Dissection of right subclavian artery  -     IN OFFICE EKG 12-LEAD (to Muse)     Pt is clinically stable and doing well    Pt is a candidate for continued medical management    Same meds    F/u with Dr Gibson    RTC 6 months  Follow up in about 6 months (around 4/18/2023).

## 2023-05-30 ENCOUNTER — OFFICE VISIT (OUTPATIENT)
Dept: CARDIOLOGY | Facility: CLINIC | Age: 88
End: 2023-05-30
Payer: MEDICARE

## 2023-05-30 VITALS
HEIGHT: 64 IN | SYSTOLIC BLOOD PRESSURE: 109 MMHG | DIASTOLIC BLOOD PRESSURE: 71 MMHG | OXYGEN SATURATION: 96 % | WEIGHT: 165.44 LBS | BODY MASS INDEX: 28.24 KG/M2 | HEART RATE: 71 BPM

## 2023-05-30 DIAGNOSIS — Z98.890 HX OF REPAIR OF DISSECTING THORACIC ANEURYSM: Primary | ICD-10-CM

## 2023-05-30 DIAGNOSIS — I51.7 LVH (LEFT VENTRICULAR HYPERTROPHY): ICD-10-CM

## 2023-05-30 DIAGNOSIS — Z86.79 HX OF REPAIR OF DISSECTING THORACIC ANEURYSM: Primary | ICD-10-CM

## 2023-05-30 DIAGNOSIS — M79.89 MASS OF SOFT TISSUE OF CHEST: ICD-10-CM

## 2023-05-30 DIAGNOSIS — I10 ESSENTIAL HYPERTENSION: ICD-10-CM

## 2023-05-30 PROCEDURE — 1160F PR REVIEW ALL MEDS BY PRESCRIBER/CLIN PHARMACIST DOCUMENTED: ICD-10-PCS | Mod: CPTII,S$GLB,, | Performed by: INTERNAL MEDICINE

## 2023-05-30 PROCEDURE — 99999 PR PBB SHADOW E&M-EST. PATIENT-LVL III: CPT | Mod: PBBFAC,,, | Performed by: INTERNAL MEDICINE

## 2023-05-30 PROCEDURE — 3074F PR MOST RECENT SYSTOLIC BLOOD PRESSURE < 130 MM HG: ICD-10-PCS | Mod: CPTII,S$GLB,, | Performed by: INTERNAL MEDICINE

## 2023-05-30 PROCEDURE — 3288F PR FALLS RISK ASSESSMENT DOCUMENTED: ICD-10-PCS | Mod: CPTII,S$GLB,, | Performed by: INTERNAL MEDICINE

## 2023-05-30 PROCEDURE — 1101F PT FALLS ASSESS-DOCD LE1/YR: CPT | Mod: CPTII,S$GLB,, | Performed by: INTERNAL MEDICINE

## 2023-05-30 PROCEDURE — 3288F FALL RISK ASSESSMENT DOCD: CPT | Mod: CPTII,S$GLB,, | Performed by: INTERNAL MEDICINE

## 2023-05-30 PROCEDURE — 1159F MED LIST DOCD IN RCRD: CPT | Mod: CPTII,S$GLB,, | Performed by: INTERNAL MEDICINE

## 2023-05-30 PROCEDURE — 93000 EKG 12-LEAD: ICD-10-PCS | Mod: S$GLB,,, | Performed by: INTERNAL MEDICINE

## 2023-05-30 PROCEDURE — 3074F SYST BP LT 130 MM HG: CPT | Mod: CPTII,S$GLB,, | Performed by: INTERNAL MEDICINE

## 2023-05-30 PROCEDURE — 99999 PR PBB SHADOW E&M-EST. PATIENT-LVL III: ICD-10-PCS | Mod: PBBFAC,,, | Performed by: INTERNAL MEDICINE

## 2023-05-30 PROCEDURE — 99213 PR OFFICE/OUTPT VISIT, EST, LEVL III, 20-29 MIN: ICD-10-PCS | Mod: 25,S$GLB,, | Performed by: INTERNAL MEDICINE

## 2023-05-30 PROCEDURE — 1160F RVW MEDS BY RX/DR IN RCRD: CPT | Mod: CPTII,S$GLB,, | Performed by: INTERNAL MEDICINE

## 2023-05-30 PROCEDURE — 1159F PR MEDICATION LIST DOCUMENTED IN MEDICAL RECORD: ICD-10-PCS | Mod: CPTII,S$GLB,, | Performed by: INTERNAL MEDICINE

## 2023-05-30 PROCEDURE — 3078F DIAST BP <80 MM HG: CPT | Mod: CPTII,S$GLB,, | Performed by: INTERNAL MEDICINE

## 2023-05-30 PROCEDURE — 99213 OFFICE O/P EST LOW 20 MIN: CPT | Mod: 25,S$GLB,, | Performed by: INTERNAL MEDICINE

## 2023-05-30 PROCEDURE — 1101F PR PT FALLS ASSESS DOC 0-1 FALLS W/OUT INJ PAST YR: ICD-10-PCS | Mod: CPTII,S$GLB,, | Performed by: INTERNAL MEDICINE

## 2023-05-30 PROCEDURE — 1126F AMNT PAIN NOTED NONE PRSNT: CPT | Mod: CPTII,S$GLB,, | Performed by: INTERNAL MEDICINE

## 2023-05-30 PROCEDURE — 3078F PR MOST RECENT DIASTOLIC BLOOD PRESSURE < 80 MM HG: ICD-10-PCS | Mod: CPTII,S$GLB,, | Performed by: INTERNAL MEDICINE

## 2023-05-30 PROCEDURE — 93000 ELECTROCARDIOGRAM COMPLETE: CPT | Mod: S$GLB,,, | Performed by: INTERNAL MEDICINE

## 2023-05-30 PROCEDURE — 1126F PR PAIN SEVERITY QUANTIFIED, NO PAIN PRESENT: ICD-10-PCS | Mod: CPTII,S$GLB,, | Performed by: INTERNAL MEDICINE

## 2023-05-30 NOTE — PROGRESS NOTES
Subjective:      Patient ID: Lisa Sepulveda is a 87 y.o. female.    Chief Complaint: Follow-up    HPI:Walks some.    Tires easily    No falls    Review of Systems   Cardiovascular:  Positive for leg swelling (rare, minor swelling of feet). Negative for chest pain, claudication, dyspnea on exertion, irregular heartbeat, near-syncope, orthopnea, palpitations and syncope.      Past Medical History:   Diagnosis Date    Aortic aneurysm     Arthritis     Hypertension         Past Surgical History:   Procedure Laterality Date    ABDOMINAL SURGERY      THORACIC AORTIC ANEURYSM REPAIR         Family History   Problem Relation Age of Onset    Hypertension Father     Stroke Father     Diabetes Sister     Diabetes Sister        Social History     Socioeconomic History    Marital status: Single   Tobacco Use    Smoking status: Never    Smokeless tobacco: Never   Substance and Sexual Activity    Alcohol use: No    Drug use: No    Sexual activity: Never       Current Outpatient Medications on File Prior to Visit   Medication Sig Dispense Refill    albuterol (PROVENTIL/VENTOLIN HFA) 90 mcg/actuation inhaler Inhale 1-2 puffs into the lungs every 6 (six) hours as needed for Wheezing. Rescue 18 g 11    aspirin 81 MG Chew Take 81 mg by mouth once daily.      folic acid/multivit-min/lutein (CENTRUM SILVER ORAL) Take by mouth.      labetalol (NORMODYNE) 100 MG tablet Take 100 mg by mouth. Take 1/2 tablet twice daily      NIFEdipine (PROCARDIA-XL) 60 MG (OSM) 24 hr tablet TAKE ONE TABLET BY MOUTH ONCE DAILY 90 tablet 3    rosuvastatin (CRESTOR) 20 MG tablet TAKE 1 TABLET BY MOUTH ONCE DAILY IN THE EVENING 90 tablet 3    [DISCONTINUED] SIMBRINZA 1-0.2 % DrpS       [DISCONTINUED] timolol maleate 0.5% (TIMOPTIC) 0.5 % Drop        No current facility-administered medications on file prior to visit.       Review of patient's allergies indicates:  No Known Allergies  Objective:     Vitals:    05/30/23 1510   BP: 109/71   BP Location: Left  "arm   Patient Position: Sitting   BP Method: Large (Automatic)   Pulse: 71   SpO2: 96%   Weight: 75 kg (165 lb 7.3 oz)   Height: 5' 4" (1.626 m)        Physical Exam  Constitutional:       Appearance: She is well-developed.   Eyes:      General: No scleral icterus.  Neck:      Vascular: No carotid bruit or JVD.   Cardiovascular:      Rate and Rhythm: Normal rate and regular rhythm.      Heart sounds: No murmur heard.    No gallop.   Pulmonary:      Breath sounds: Normal breath sounds.   Chest:      Comments: There is a soft tissue mass inferior to the medial left clavicle, superficial , mobile  Musculoskeletal:      Right lower leg: No edema.      Left lower leg: No edema.   Skin:     General: Skin is warm and dry.   Neurological:      Mental Status: She is alert and oriented to person, place, and time.   Psychiatric:         Behavior: Behavior normal.         Thought Content: Thought content normal.         Judgment: Judgment normal.      ECG today: NSR, inverted T waves in inferior and lateral leads, LVH, reviewed by me, unchanged    No visits with results within 6 Month(s) from this visit.   Latest known visit with results is:   Lab Visit on 04/01/2022   Component Date Value Ref Range Status    WBC 04/01/2022 5.23  3.90 - 12.70 K/uL Final    RBC 04/01/2022 4.75  4.00 - 5.40 M/uL Final    Hemoglobin 04/01/2022 13.8  12.0 - 16.0 g/dL Final    Hematocrit 04/01/2022 42.4  37.0 - 48.5 % Final    MCV 04/01/2022 89  82 - 98 fL Final    MCH 04/01/2022 29.1  27.0 - 31.0 pg Final    MCHC 04/01/2022 32.5  32.0 - 36.0 g/dL Final    RDW 04/01/2022 15.2 (H)  11.5 - 14.5 % Final    Platelets 04/01/2022 211  150 - 450 K/uL Final    MPV 04/01/2022 11.9  9.2 - 12.9 fL Final    Immature Granulocytes 04/01/2022 0.2  0.0 - 0.5 % Final    Gran # (ANC) 04/01/2022 2.6  1.8 - 7.7 K/uL Final    Immature Grans (Abs) 04/01/2022 0.01  0.00 - 0.04 K/uL Final    Lymph # 04/01/2022 2.0  1.0 - 4.8 K/uL Final    Mono # 04/01/2022 0.4  0.3 - 1.0 " K/uL Final    Eos # 04/01/2022 0.2  0.0 - 0.5 K/uL Final    Baso # 04/01/2022 0.05  0.00 - 0.20 K/uL Final    nRBC 04/01/2022 0  0 /100 WBC Final    Gran % 04/01/2022 49.3  38.0 - 73.0 % Final    Lymph % 04/01/2022 38.4  18.0 - 48.0 % Final    Mono % 04/01/2022 7.8  4.0 - 15.0 % Final    Eosinophil % 04/01/2022 3.3  0.0 - 8.0 % Final    Basophil % 04/01/2022 1.0  0.0 - 1.9 % Final    Differential Method 04/01/2022 Automated   Final    Sodium 04/01/2022 142  136 - 145 mmol/L Final    Potassium 04/01/2022 3.9  3.5 - 5.1 mmol/L Final    Chloride 04/01/2022 107  95 - 110 mmol/L Final    CO2 04/01/2022 25  23 - 29 mmol/L Final    Glucose 04/01/2022 81  70 - 110 mg/dL Final    BUN 04/01/2022 11  8 - 23 mg/dL Final    Creatinine 04/01/2022 0.9  0.5 - 1.4 mg/dL Final    Calcium 04/01/2022 9.5  8.7 - 10.5 mg/dL Final    Total Protein 04/01/2022 7.7  6.0 - 8.4 g/dL Final    Albumin 04/01/2022 4.0  3.5 - 5.2 g/dL Final    Total Bilirubin 04/01/2022 0.4  0.1 - 1.0 mg/dL Final    Alkaline Phosphatase 04/01/2022 58  55 - 135 U/L Final    AST 04/01/2022 23  10 - 40 U/L Final    ALT 04/01/2022 13  10 - 44 U/L Final    Anion Gap 04/01/2022 10  8 - 16 mmol/L Final    eGFR if African American 04/01/2022 >60  >60 mL/min/1.73 m^2 Final    eGFR if non African American 04/01/2022 59 (A)  >60 mL/min/1.73 m^2 Final    Cholesterol 04/01/2022 135  120 - 199 mg/dL Final    Triglycerides 04/01/2022 67  30 - 150 mg/dL Final    HDL 04/01/2022 59  40 - 75 mg/dL Final    LDL Cholesterol 04/01/2022 62.6 (L)  63.0 - 159.0 mg/dL Final    HDL/Cholesterol Ratio 04/01/2022 43.7  20.0 - 50.0 % Final    Total Cholesterol/HDL Ratio 04/01/2022 2.3  2.0 - 5.0 Final    Non-HDL Cholesterol 04/01/2022 76  mg/dL Final    TSH 04/01/2022 2.419  0.400 - 4.000 uIU/mL Final   (    Assessment:     1. Hx of repair of dissecting thoracic aneurysm    2. Essential hypertension    3. LVH (left ventricular hypertrophy)      Plan:   Lisa was seen today for  follow-up.    Diagnoses and all orders for this visit:    Hx of repair of dissecting thoracic aneurysm  -     IN OFFICE EKG 12-LEAD (to Muse)    Essential hypertension  -     IN OFFICE EKG 12-LEAD (to Muse)    LVH (left ventricular hypertrophy)  -     IN OFFICE EKG 12-LEAD (to Muse)     Suspect lipoma inferior to left clavicle.  Will consult general surgery    Same meds    F/u with Dr Gibson    RTZHAO 6 months    Check lab    No follow-ups on file.

## 2023-06-07 ENCOUNTER — OFFICE VISIT (OUTPATIENT)
Dept: SURGERY | Facility: CLINIC | Age: 88
End: 2023-06-07
Payer: MEDICARE

## 2023-06-07 VITALS
BODY MASS INDEX: 26.72 KG/M2 | DIASTOLIC BLOOD PRESSURE: 81 MMHG | HEIGHT: 64 IN | HEART RATE: 90 BPM | WEIGHT: 156.5 LBS | SYSTOLIC BLOOD PRESSURE: 114 MMHG

## 2023-06-07 DIAGNOSIS — M79.89 MASS OF SOFT TISSUE OF CHEST: ICD-10-CM

## 2023-06-07 PROCEDURE — 3074F PR MOST RECENT SYSTOLIC BLOOD PRESSURE < 130 MM HG: ICD-10-PCS | Mod: CPTII,S$GLB,, | Performed by: STUDENT IN AN ORGANIZED HEALTH CARE EDUCATION/TRAINING PROGRAM

## 2023-06-07 PROCEDURE — 3288F FALL RISK ASSESSMENT DOCD: CPT | Mod: CPTII,S$GLB,, | Performed by: STUDENT IN AN ORGANIZED HEALTH CARE EDUCATION/TRAINING PROGRAM

## 2023-06-07 PROCEDURE — 1159F PR MEDICATION LIST DOCUMENTED IN MEDICAL RECORD: ICD-10-PCS | Mod: CPTII,S$GLB,, | Performed by: STUDENT IN AN ORGANIZED HEALTH CARE EDUCATION/TRAINING PROGRAM

## 2023-06-07 PROCEDURE — 1159F MED LIST DOCD IN RCRD: CPT | Mod: CPTII,S$GLB,, | Performed by: STUDENT IN AN ORGANIZED HEALTH CARE EDUCATION/TRAINING PROGRAM

## 2023-06-07 PROCEDURE — 1101F PT FALLS ASSESS-DOCD LE1/YR: CPT | Mod: CPTII,S$GLB,, | Performed by: STUDENT IN AN ORGANIZED HEALTH CARE EDUCATION/TRAINING PROGRAM

## 2023-06-07 PROCEDURE — 1126F PR PAIN SEVERITY QUANTIFIED, NO PAIN PRESENT: ICD-10-PCS | Mod: CPTII,S$GLB,, | Performed by: STUDENT IN AN ORGANIZED HEALTH CARE EDUCATION/TRAINING PROGRAM

## 2023-06-07 PROCEDURE — 99999 PR PBB SHADOW E&M-EST. PATIENT-LVL III: ICD-10-PCS | Mod: PBBFAC,,, | Performed by: STUDENT IN AN ORGANIZED HEALTH CARE EDUCATION/TRAINING PROGRAM

## 2023-06-07 PROCEDURE — 1101F PR PT FALLS ASSESS DOC 0-1 FALLS W/OUT INJ PAST YR: ICD-10-PCS | Mod: CPTII,S$GLB,, | Performed by: STUDENT IN AN ORGANIZED HEALTH CARE EDUCATION/TRAINING PROGRAM

## 2023-06-07 PROCEDURE — 3074F SYST BP LT 130 MM HG: CPT | Mod: CPTII,S$GLB,, | Performed by: STUDENT IN AN ORGANIZED HEALTH CARE EDUCATION/TRAINING PROGRAM

## 2023-06-07 PROCEDURE — 99204 OFFICE O/P NEW MOD 45 MIN: CPT | Mod: S$GLB,,, | Performed by: STUDENT IN AN ORGANIZED HEALTH CARE EDUCATION/TRAINING PROGRAM

## 2023-06-07 PROCEDURE — 3079F PR MOST RECENT DIASTOLIC BLOOD PRESSURE 80-89 MM HG: ICD-10-PCS | Mod: CPTII,S$GLB,, | Performed by: STUDENT IN AN ORGANIZED HEALTH CARE EDUCATION/TRAINING PROGRAM

## 2023-06-07 PROCEDURE — 3288F PR FALLS RISK ASSESSMENT DOCUMENTED: ICD-10-PCS | Mod: CPTII,S$GLB,, | Performed by: STUDENT IN AN ORGANIZED HEALTH CARE EDUCATION/TRAINING PROGRAM

## 2023-06-07 PROCEDURE — 99999 PR PBB SHADOW E&M-EST. PATIENT-LVL III: CPT | Mod: PBBFAC,,, | Performed by: STUDENT IN AN ORGANIZED HEALTH CARE EDUCATION/TRAINING PROGRAM

## 2023-06-07 PROCEDURE — 1126F AMNT PAIN NOTED NONE PRSNT: CPT | Mod: CPTII,S$GLB,, | Performed by: STUDENT IN AN ORGANIZED HEALTH CARE EDUCATION/TRAINING PROGRAM

## 2023-06-07 PROCEDURE — 3079F DIAST BP 80-89 MM HG: CPT | Mod: CPTII,S$GLB,, | Performed by: STUDENT IN AN ORGANIZED HEALTH CARE EDUCATION/TRAINING PROGRAM

## 2023-06-07 PROCEDURE — 99204 PR OFFICE/OUTPT VISIT, NEW, LEVL IV, 45-59 MIN: ICD-10-PCS | Mod: S$GLB,,, | Performed by: STUDENT IN AN ORGANIZED HEALTH CARE EDUCATION/TRAINING PROGRAM

## 2023-06-07 NOTE — PROGRESS NOTES
Surgery Clinic Note - H and P    Subjective:     Lisa Sepulveda is a 87 y.o. female with h/o aortic aneurysm, arthritis, and HTN who presents to clinic as a referral from cardiology for anterior chest lipoma. It has been present for years, has not significantly changed in size and does not bother her. She has had no wound over the area, erythema, pain or drainage.       PMH:   Past Medical History:   Diagnosis Date    Aortic aneurysm     Arthritis     Hypertension        Past Surgical History:   Past Surgical History:   Procedure Laterality Date    ABDOMINAL SURGERY      THORACIC AORTIC ANEURYSM REPAIR         Social History:  Social History     Socioeconomic History    Marital status: Single   Tobacco Use    Smoking status: Never    Smokeless tobacco: Never   Substance and Sexual Activity    Alcohol use: No    Drug use: No    Sexual activity: Never       Allergies: Review of patient's allergies indicates:  No Known Allergies    Medications:  Current Outpatient Medications:     albuterol (PROVENTIL/VENTOLIN HFA) 90 mcg/actuation inhaler, Inhale 1-2 puffs into the lungs every 6 (six) hours as needed for Wheezing. Rescue, Disp: 18 g, Rfl: 11    aspirin 81 MG Chew, Take 81 mg by mouth once daily., Disp: , Rfl:     folic acid/multivit-min/lutein (CENTRUM SILVER ORAL), Take by mouth., Disp: , Rfl:     labetalol (NORMODYNE) 100 MG tablet, Take 100 mg by mouth. Take 1/2 tablet twice daily, Disp: , Rfl:     NIFEdipine (PROCARDIA-XL) 60 MG (OSM) 24 hr tablet, TAKE ONE TABLET BY MOUTH ONCE DAILY, Disp: 90 tablet, Rfl: 3    rosuvastatin (CRESTOR) 20 MG tablet, TAKE 1 TABLET BY MOUTH ONCE DAILY IN THE EVENING, Disp: 90 tablet, Rfl: 3    Current Outpatient Medications on File Prior to Visit   Medication Sig Dispense Refill    albuterol (PROVENTIL/VENTOLIN HFA) 90 mcg/actuation inhaler Inhale 1-2 puffs into the lungs every 6 (six) hours as needed for Wheezing. Rescue 18 g 11    aspirin 81 MG Chew Take 81 mg by mouth once daily.       folic acid/multivit-min/lutein (CENTRUM SILVER ORAL) Take by mouth.      labetalol (NORMODYNE) 100 MG tablet Take 100 mg by mouth. Take 1/2 tablet twice daily      NIFEdipine (PROCARDIA-XL) 60 MG (OSM) 24 hr tablet TAKE ONE TABLET BY MOUTH ONCE DAILY 90 tablet 3    rosuvastatin (CRESTOR) 20 MG tablet TAKE 1 TABLET BY MOUTH ONCE DAILY IN THE EVENING 90 tablet 3     No current facility-administered medications on file prior to visit.         Objective:     PHYSICAL EXAM:  Vital Signs (Most Recent)  Pulse: 90 (06/07/23 1511)  BP: 114/81 (06/07/23 1511)    ROS A 10+ review of systems was performed with pertinent positives and negatives noted above in the history of present illness.  Other systems were negative unless otherwise specified.    Physical Exam  Constitutional:       General: She is not in acute distress.     Appearance: Normal appearance. She is not ill-appearing.   HENT:      Head: Normocephalic and atraumatic.   Eyes:      Pupils: Pupils are equal, round, and reactive to light.   Cardiovascular:      Rate and Rhythm: Normal rate and regular rhythm.      Pulses: Normal pulses.   Pulmonary:      Effort: Pulmonary effort is normal. No respiratory distress.   Musculoskeletal:      Comments: Left anterior chest soft tissue mass, soft, somewhat mobile, non-tender, no wound or skin changes. Consistent with lipoma   Neurological:      General: No focal deficit present.      Mental Status: She is alert and oriented to person, place, and time.   Psychiatric:         Mood and Affect: Mood normal.         Behavior: Behavior normal.         Thought Content: Thought content normal.         Judgment: Judgment normal.          Assessment:     87 y.o. female with anterior chest lipoma    Plan:     - it does not bother her and she does not want excisional biopsy  - Return to clinic as needed    Carlos Hilario MD   [facility]

## 2023-06-12 ENCOUNTER — TELEPHONE (OUTPATIENT)
Dept: CARDIOLOGY | Facility: CLINIC | Age: 88
End: 2023-06-12
Payer: MEDICARE

## 2023-06-12 NOTE — TELEPHONE ENCOUNTER
LM for the patient that her labs are ok.      ----- Message from Yang Pereira MD sent at 6/12/2023 11:51 AM CDT -----  Lab OK, Please call pt.

## 2023-12-12 ENCOUNTER — OFFICE VISIT (OUTPATIENT)
Dept: CARDIOLOGY | Facility: CLINIC | Age: 88
End: 2023-12-12
Payer: MEDICARE

## 2023-12-12 VITALS
SYSTOLIC BLOOD PRESSURE: 123 MMHG | HEIGHT: 64 IN | OXYGEN SATURATION: 98 % | BODY MASS INDEX: 26.87 KG/M2 | HEART RATE: 76 BPM | DIASTOLIC BLOOD PRESSURE: 81 MMHG

## 2023-12-12 DIAGNOSIS — I10 ESSENTIAL HYPERTENSION: Primary | ICD-10-CM

## 2023-12-12 DIAGNOSIS — I77.79: ICD-10-CM

## 2023-12-12 DIAGNOSIS — Z98.890 HX OF REPAIR OF DISSECTING THORACIC ANEURYSM: ICD-10-CM

## 2023-12-12 DIAGNOSIS — Z86.79 HX OF REPAIR OF DISSECTING THORACIC ANEURYSM: ICD-10-CM

## 2023-12-12 DIAGNOSIS — R53.2 FUNCTIONAL QUADRIPLEGIA: ICD-10-CM

## 2023-12-12 PROCEDURE — 1126F PR PAIN SEVERITY QUANTIFIED, NO PAIN PRESENT: ICD-10-PCS | Mod: CPTII,S$GLB,, | Performed by: INTERNAL MEDICINE

## 2023-12-12 PROCEDURE — 99999 PR PBB SHADOW E&M-EST. PATIENT-LVL III: CPT | Mod: PBBFAC,,, | Performed by: INTERNAL MEDICINE

## 2023-12-12 PROCEDURE — 1101F PR PT FALLS ASSESS DOC 0-1 FALLS W/OUT INJ PAST YR: ICD-10-PCS | Mod: CPTII,S$GLB,, | Performed by: INTERNAL MEDICINE

## 2023-12-12 PROCEDURE — 99999 PR PBB SHADOW E&M-EST. PATIENT-LVL III: ICD-10-PCS | Mod: PBBFAC,,, | Performed by: INTERNAL MEDICINE

## 2023-12-12 PROCEDURE — 1126F AMNT PAIN NOTED NONE PRSNT: CPT | Mod: CPTII,S$GLB,, | Performed by: INTERNAL MEDICINE

## 2023-12-12 PROCEDURE — 1160F PR REVIEW ALL MEDS BY PRESCRIBER/CLIN PHARMACIST DOCUMENTED: ICD-10-PCS | Mod: CPTII,S$GLB,, | Performed by: INTERNAL MEDICINE

## 2023-12-12 PROCEDURE — 1159F MED LIST DOCD IN RCRD: CPT | Mod: CPTII,S$GLB,, | Performed by: INTERNAL MEDICINE

## 2023-12-12 PROCEDURE — 3288F FALL RISK ASSESSMENT DOCD: CPT | Mod: CPTII,S$GLB,, | Performed by: INTERNAL MEDICINE

## 2023-12-12 PROCEDURE — 93000 EKG 12-LEAD: ICD-10-PCS | Mod: S$GLB,,, | Performed by: INTERNAL MEDICINE

## 2023-12-12 PROCEDURE — 99213 OFFICE O/P EST LOW 20 MIN: CPT | Mod: 25,S$GLB,, | Performed by: INTERNAL MEDICINE

## 2023-12-12 PROCEDURE — 99213 PR OFFICE/OUTPT VISIT, EST, LEVL III, 20-29 MIN: ICD-10-PCS | Mod: 25,S$GLB,, | Performed by: INTERNAL MEDICINE

## 2023-12-12 PROCEDURE — 93000 ELECTROCARDIOGRAM COMPLETE: CPT | Mod: S$GLB,,, | Performed by: INTERNAL MEDICINE

## 2023-12-12 PROCEDURE — 1160F RVW MEDS BY RX/DR IN RCRD: CPT | Mod: CPTII,S$GLB,, | Performed by: INTERNAL MEDICINE

## 2023-12-12 PROCEDURE — 1159F PR MEDICATION LIST DOCUMENTED IN MEDICAL RECORD: ICD-10-PCS | Mod: CPTII,S$GLB,, | Performed by: INTERNAL MEDICINE

## 2023-12-12 PROCEDURE — 1101F PT FALLS ASSESS-DOCD LE1/YR: CPT | Mod: CPTII,S$GLB,, | Performed by: INTERNAL MEDICINE

## 2023-12-12 PROCEDURE — 3288F PR FALLS RISK ASSESSMENT DOCUMENTED: ICD-10-PCS | Mod: CPTII,S$GLB,, | Performed by: INTERNAL MEDICINE

## 2023-12-12 NOTE — PROGRESS NOTES
Subjective:      Patient ID: Lisa Sepulveda is a 88 y.o. female.    Chief Complaint: Follow-up    HPI:  Walks around the house with a cane.    Legs have been a little weak ever since thoracic aneurysm was repaired.    No fall or faint    Review of Systems   Cardiovascular:  Negative for chest pain, claudication, dyspnea on exertion, irregular heartbeat, leg swelling, near-syncope, orthopnea, palpitations and syncope.        Past Medical History:   Diagnosis Date    Aortic aneurysm     Arthritis     Hypertension         Past Surgical History:   Procedure Laterality Date    ABDOMINAL SURGERY      THORACIC AORTIC ANEURYSM REPAIR         Family History   Problem Relation Age of Onset    Hypertension Father     Stroke Father     Diabetes Sister     Diabetes Sister        Social History     Socioeconomic History    Marital status: Single   Tobacco Use    Smoking status: Never    Smokeless tobacco: Never   Substance and Sexual Activity    Alcohol use: No    Drug use: No    Sexual activity: Never       Current Outpatient Medications on File Prior to Visit   Medication Sig Dispense Refill    albuterol (PROVENTIL/VENTOLIN HFA) 90 mcg/actuation inhaler Inhale 1-2 puffs into the lungs every 6 (six) hours as needed for Wheezing. Rescue 18 g 11    aspirin 81 MG Chew Take 81 mg by mouth once daily.      folic acid/multivit-min/lutein (CENTRUM SILVER ORAL) Take by mouth.      labetalol (NORMODYNE) 100 MG tablet Take 100 mg by mouth. Take 1/2 tablet twice daily      NIFEdipine (PROCARDIA-XL) 60 MG (OSM) 24 hr tablet TAKE ONE TABLET BY MOUTH ONCE DAILY 90 tablet 3    rosuvastatin (CRESTOR) 20 MG tablet TAKE 1 TABLET BY MOUTH ONCE DAILY IN THE EVENING 90 tablet 3     No current facility-administered medications on file prior to visit.       Review of patient's allergies indicates:  No Known Allergies  Objective:     Vitals:    12/12/23 1525   BP: 123/81   BP Location: Left arm   Patient Position: Sitting   BP Method: Large  "(Automatic)   Pulse: 76   SpO2: 98%   Weight: Comment: wheelchair   Height: 5' 4" (1.626 m)        Physical Exam  Constitutional:       Appearance: She is well-developed.   Eyes:      General: No scleral icterus.  Neck:      Vascular: No carotid bruit or JVD.   Cardiovascular:      Rate and Rhythm: Normal rate and regular rhythm.      Heart sounds: No murmur heard.     No gallop.   Pulmonary:      Breath sounds: Normal breath sounds.   Musculoskeletal:      Right lower leg: No edema.      Left lower leg: No edema.   Skin:     General: Skin is warm and dry.   Neurological:      Mental Status: She is alert and oriented to person, place, and time.   Psychiatric:         Behavior: Behavior normal.         Thought Content: Thought content normal.         Judgment: Judgment normal.        ECG today reviewed by me: NSR with PAC's, inverted T waves in anterolateral leads, unchanged        No visits with results within 6 Month(s) from this visit.   Latest known visit with results is:   Lab Visit on 06/09/2023   Component Date Value Ref Range Status    WBC 06/09/2023 5.75  3.90 - 12.70 K/uL Final    RBC 06/09/2023 4.63  4.00 - 5.40 M/uL Final    Hemoglobin 06/09/2023 13.1  12.0 - 16.0 g/dL Final    Hematocrit 06/09/2023 41.3  37.0 - 48.5 % Final    MCV 06/09/2023 89  82 - 98 fL Final    MCH 06/09/2023 28.3  27.0 - 31.0 pg Final    MCHC 06/09/2023 31.7 (L)  32.0 - 36.0 g/dL Final    RDW 06/09/2023 15.2 (H)  11.5 - 14.5 % Final    Platelets 06/09/2023 250  150 - 450 K/uL Final    MPV 06/09/2023 11.2  9.2 - 12.9 fL Final    Immature Granulocytes 06/09/2023 0.2  0.0 - 0.5 % Final    Gran # (ANC) 06/09/2023 3.1  1.8 - 7.7 K/uL Final    Immature Grans (Abs) 06/09/2023 0.01  0.00 - 0.04 K/uL Final    Lymph # 06/09/2023 2.0  1.0 - 4.8 K/uL Final    Mono # 06/09/2023 0.4  0.3 - 1.0 K/uL Final    Eos # 06/09/2023 0.2  0.0 - 0.5 K/uL Final    Baso # 06/09/2023 0.07  0.00 - 0.20 K/uL Final    nRBC 06/09/2023 0  0 /100 WBC Final    Gran % " 06/09/2023 53.1  38.0 - 73.0 % Final    Lymph % 06/09/2023 35.1  18.0 - 48.0 % Final    Mono % 06/09/2023 7.3  4.0 - 15.0 % Final    Eosinophil % 06/09/2023 3.1  0.0 - 8.0 % Final    Basophil % 06/09/2023 1.2  0.0 - 1.9 % Final    Differential Method 06/09/2023 Automated   Final    Sodium 06/09/2023 140  136 - 145 mmol/L Final    Potassium 06/09/2023 4.0  3.5 - 5.1 mmol/L Final    Chloride 06/09/2023 102  95 - 110 mmol/L Final    CO2 06/09/2023 26  23 - 29 mmol/L Final    Glucose 06/09/2023 91  70 - 110 mg/dL Final    BUN 06/09/2023 13  7 - 17 mg/dL Final    Creatinine 06/09/2023 0.88  0.50 - 1.40 mg/dL Final    Calcium 06/09/2023 9.4  8.7 - 10.5 mg/dL Final    Total Protein 06/09/2023 7.7  6.0 - 8.4 g/dL Final    Albumin 06/09/2023 4.2  3.5 - 5.2 g/dL Final    Total Bilirubin 06/09/2023 0.4  0.1 - 1.0 mg/dL Final    Alkaline Phosphatase 06/09/2023 64  38 - 126 U/L Final    AST 06/09/2023 34  15 - 46 U/L Final    ALT 06/09/2023 21  10 - 44 U/L Final    Anion Gap 06/09/2023 12  8 - 16 mmol/L Final    eGFR 06/09/2023 >60.0  >60 mL/min/1.73 m^2 Final    Cholesterol 06/09/2023 135  120 - 199 mg/dL Final    Triglycerides 06/09/2023 85  30 - 150 mg/dL Final    HDL 06/09/2023 57  40 - 75 mg/dL Final    LDL Cholesterol 06/09/2023 61.0 (L)  63.0 - 159.0 mg/dL Final    HDL/Cholesterol Ratio 06/09/2023 42.2  20.0 - 50.0 % Final    Total Cholesterol/HDL Ratio 06/09/2023 2.4  2.0 - 5.0 Final    Non-HDL Cholesterol 06/09/2023 78  mg/dL Final    TSH 06/09/2023 2.020  0.400 - 4.000 uIU/mL Final   (Holter monitor - 48 hour    Height:  Not recorded   Weight:  Not recorded   Blood Pressure:  Not recorded    Date of Study:  12/23/20   Ordering Provider:  Yang Pereira MD   Clinical Indications:  Dissection of thoracic aorta [I71.01 (ICD-10-CM)], Near syncope [R55 (ICD-10-CM)]       Interpreting Physicians  Performing Staff   Yang Pereira MD Tech:  Michelle Eastman        Reason for Exam  Priority: Routine  Dx: Dissection  "of thoracic aorta [I71.019 (ICD-10-CM)]; Near syncope [R55 (ICD-10-CM)]     Conclusion    Normal sinus rhythm with a heart rate variable between 52 and 130 bpm, averaging 78 bpm  One single PVC was observed  There are frequent PAC's including 562 PAC couplets and several PAC triplets and rare episodes of ectopic atrial tachycardia up to 4 beats in duration at rates up to 197 bpm     Performing Clinician     Transthoracic echo (TTE) complete    Height:  5' 4" (1.626 m)   Weight:  78 kg (172 lb)   Blood Pressure:  Not recorded    Date of Study:  12/23/20   Ordering Provider:  Yang Pereira MD   Clinical Indications:  Dissection of thoracic aorta [I71.01 (ICD-10-CM)], Near syncope [R55 (ICD-10-CM)]       Interpreting Physicians  Performing Staff   Yang Pereira MD Tech:  RaizaMichelle corbin        Reason for Exam  Priority: Routine  Dx: Dissection of thoracic aorta [I71.019 (ICD-10-CM)]; Near syncope [R55 (ICD-10-CM)]     Result Image Hyperlink     Show images for Echo Color Flow Doppler? Yes  Summary    The left ventricle is normal in size with concentric remodeling and normal systolic function. The estimated ejection fraction is 70%  Grade I left ventricular diastolic dysfunction.  Mild left atrial enlargement.  Normal right ventricular size with normal right ventricular systolic function.  Mild mitral regurgitation.  Normal central venous pressure (3 mmHg).  The estimated PA systolic pressure is 17 mmHg.   Encounter Form Printed    Encounter form printed on 12/23/20 07:48 AM         CTA Chest Abdomen Non Coronary  Status: Final result     Sc[facility] Results Release    Veracyte Status: Pending  Results Release     PACS Images for [facility] Viewer     Show images for CTA Chest Abdomen Non Coronary  All Reviewers List    Yang Pereira MD on 12/24/2020 13:45      Contains abnormal data CTA Chest Abdomen Non Coronary  Order: [id number]  Status: Final result     Visible to patient: No (inaccessible in " Patient Portal)     Next appt: None     Dx: Dissection of thoracic aorta     0 Result Notes  Details    Reading Physician Reading Date Result Priority   Enmanuel Mendoza MD  [phone] 12/23/2020 Routine     Narrative & Impression  EXAMINATION:  CTA CHEST ABDOMEN NON CORONARY (XPD)     CLINICAL HISTORY:  Thoracic aortic dissection, known, follow up;  Dissection of thoracic aorta     TECHNIQUE:  CT examination of the chest and abdomen was performed via aortic aneurysm protocol after the administration of 100 mL intravenous contrast, Omnipaque 350.  Precontrast axial imaging of the chest and abdomen was performed, arterial phase postcontrast imaging of the chest and abdomen, and delayed imaging of the abdomen was performed.  Sagittal and coronal reconstruction imaging and axial MIP reconstruction imaging is submitted.     COMPARISON:  CTA chest and abdomen May 16, 2017     FINDINGS:  There is a heterogeneous nodule of the left thyroid lobe, best appreciated on series 2 axial image 31 measuring approximately 10.5 mm, if previously unknown and if clinically warranted ultrasound follow-up is recommended.  Just posterior to the left thyroid lobe there is an area of a mixed attenuation character, including air and some mild dense material, this measures up to approximately 1.7 cm in size, and appears to have been present on multiple prior examinations, may relate to a laryngocele or a esophageal diverticulum.  It measures mildly more prominent in size having measured approximately 1.2 cm on the prior exam.     Findings consistent with thoracic aortic aneurysm endovascular stent repair/placement again noted, the thoracic aorta demonstrates appropriate opacification, the endovascular stent appears stable when compared to the prior exam.  The ascending thoracic aorta measures approximately 4.5 cm anterior to posterior dimension when measured at a similar level to the prior examination in which it measured 4.3 cm.  The  descending thoracic aorta along the course of the endovascular stent measures up to approximately 3.6 cm anterior to posterior dimension, compared to approximately 3.5 cm when measured at a similar level on the prior exam.  The distal descending thoracic aorta below the level of the endovascular stent measures approximately 2.7 cm, compared to 2.7 cm on the prior exam.  There is no evidence for aneurysmal dilatation of the abdominal aorta, the abdominal aorta demonstrates appropriate opacification.  There is no evidence for thoracic or abdominal aortic dissection, acute leak or periaortic hematoma.     There is a short segment dissection seen involving the right subclavian artery as best seen on series 3 axial images 36 through 49, over the course of approximately 3.5 cm, there appears to be opacification of the true and the false lumen.  The visualized great vessels of the head and neck otherwise demonstrate appropriate opacification, tortuosity is noted.     The celiac artery, hepatic artery, splenic artery, and superior mesenteric artery demonstrate appropriate opacification, atherosclerotic changes noted.  The renal arteries bilaterally demonstrate appropriate opacification, atherosclerotic change noted without high-grade stenosis.  As on the prior examination there is an aneurysm arising from the superior margin of the proximal aspect of the left renal artery near its origin, this appears at the level of the origin of what appears to be a left adrenal artery, as well as what appears to be a spinal artery extending in a curvilinear fashion posteriorly.  This is difficult to accurately measure due to its configuration however appears stable when comparison to axial imaging, sagittal and coronal reconstruction imaging without a large degree of interval change.  The visualized iliac arterial vascular demonstrate appropriate opacification, atherosclerotic changes noted.     The lungs demonstrate mild motion  artifact, there is atelectatic change, mild ground-glass density may relate to mild ground-glass infiltrate although could relate to areas of diminished depth of inspiration.  There is no enlarged mediastinal or hilar adenopathy.  Coronary arterial atherosclerotic changes noted.  There is no pericardial effusion, there is no pleural effusion, there is no hemopericardium and no hemothorax.  There is no pneumothorax.     There is appearance suggesting a small hiatal hernia, the stomach is decompressed.  There is no evidence for acute process of the gallbladder, pancreas, spleen, or adrenal glands.  Hypodense lesion of the left lobe of the liver again noted, not completely evaluated on this examination however appears stable.  Renal hypodensities are again noted, small hypodensity at the upper pole of the right kidney, too small for characterization appears stable, somewhat bilobed cysts of the left kidney again noted, these are at the mid to lower pole, the more prominent has increased in size now measuring up to approximately 7.9 cm, the less prominent inferiorly mildly increased in size measuring up to 5.9 cm.  The superior aspect of the urinary bladder appears distended otherwise unremarkable.  There is a small fat density anterior abdominal wall hernia incompletely imaged likely umbilical hernia.  There is no evidence for bowel inflammatory or obstructive process.  The appendix is identified and does not appear inflamed.  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  There is no evidence for free intraperitoneal air.     Note is made of intravenous contrast extravasation of the right upper extremity.  The visualized osseous structures demonstrate chronic change.  There is diminished mineralization and degenerative change noted.     Impression:     Thoracic aortic aneurysm again noted, with endovascular stent repair again noted, as detailed above.      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     Interval development of relatively short segment approximately 3.5 cm dissection involving the right subclavian artery, as discussed above.     10.5 mm left thyroid nodule, if previously unknown and if clinically warranted ultrasound follow-up can be done for characterization.     Intravenous contrast examination at the level of the right upper extremity injection site.     1.7 cm finding of the left neck may represent laryngocels or esophageal diverticulum, seen on multiple prior examinations, mildly more prominent in size previously measuring 1.2 cm.     Renal cysts are again noted, mild increased in size when compared to the prior study.     Additional findings are noted as above.     This report was flagged in Epic as abnormal.        Electronically signed by: Enmanuel Mendoza  Date:                                            12/23/2020  Time:                                           09:40       Assessment:     1. Essential hypertension    2. Hx of repair of dissecting thoracic aneurysm    3. Dissection of right subclavian artery    4. Functional quadriplegia      Plan:   Lisa was seen today for follow-up.    Diagnoses and all orders for this visit:    Essential hypertension  -     IN OFFICE EKG 12-LEAD (to [facility])    Hx of repair of dissecting thoracic aneurysm    Dissection of right subclavian artery    Functional quadriplegia     Pt is doing well    Same meds    Follow up in about 6 months (around 6/12/2024).

## 2024-01-01 ENCOUNTER — ANESTHESIA EVENT (OUTPATIENT)
Dept: NEUROLOGY | Facility: HOSPITAL | Age: 89
End: 2024-01-01
Payer: MEDICARE

## 2024-01-01 ENCOUNTER — HOSPITAL ENCOUNTER (INPATIENT)
Facility: HOSPITAL | Age: 89
LOS: 1 days | DRG: 065 | End: 2024-09-21
Attending: EMERGENCY MEDICINE | Admitting: PSYCHIATRY & NEUROLOGY
Payer: MEDICARE

## 2024-01-01 ENCOUNTER — ANESTHESIA (OUTPATIENT)
Dept: NEUROLOGY | Facility: HOSPITAL | Age: 89
End: 2024-01-01
Payer: MEDICARE

## 2024-01-01 VITALS
BODY MASS INDEX: 26.79 KG/M2 | HEART RATE: 99 BPM | SYSTOLIC BLOOD PRESSURE: 90 MMHG | RESPIRATION RATE: 26 BRPM | OXYGEN SATURATION: 98 % | TEMPERATURE: 98 F | DIASTOLIC BLOOD PRESSURE: 61 MMHG | WEIGHT: 156.06 LBS

## 2024-01-01 DIAGNOSIS — I63.411 STROKE DUE TO EMBOLISM OF RIGHT MIDDLE CEREBRAL ARTERY: Primary | ICD-10-CM

## 2024-01-01 DIAGNOSIS — I63.9 CEREBROVASCULAR ACCIDENT (CVA), UNSPECIFIED MECHANISM: ICD-10-CM

## 2024-01-01 PROCEDURE — 36620 INSERTION CATHETER ARTERY: CPT | Mod: ,,, | Performed by: NURSE ANESTHETIST, CERTIFIED REGISTERED

## 2024-01-01 PROCEDURE — 99900035 HC TECH TIME PER 15 MIN (STAT)

## 2024-01-01 PROCEDURE — 25000003 PHARM REV CODE 250: Performed by: NURSE ANESTHETIST, CERTIFIED REGISTERED

## 2024-01-01 PROCEDURE — 25000003 PHARM REV CODE 250

## 2024-01-01 PROCEDURE — 96374 THER/PROPH/DIAG INJ IV PUSH: CPT

## 2024-01-01 PROCEDURE — 20000000 HC ICU ROOM

## 2024-01-01 PROCEDURE — 25500020 PHARM REV CODE 255

## 2024-01-01 PROCEDURE — 99223 1ST HOSP IP/OBS HIGH 75: CPT | Mod: FS,,, | Performed by: PSYCHIATRY & NEUROLOGY

## 2024-01-01 PROCEDURE — 99291 CRITICAL CARE FIRST HOUR: CPT | Mod: ,,,

## 2024-01-01 PROCEDURE — 99285 EMERGENCY DEPT VISIT HI MDM: CPT | Mod: 25

## 2024-01-01 PROCEDURE — 63600175 PHARM REV CODE 636 W HCPCS: Mod: JG | Performed by: NURSE ANESTHETIST, CERTIFIED REGISTERED

## 2024-01-01 PROCEDURE — 5A1935Z RESPIRATORY VENTILATION, LESS THAN 24 CONSECUTIVE HOURS: ICD-10-PCS | Performed by: PSYCHIATRY & NEUROLOGY

## 2024-01-01 RX ORDER — PHENYLEPHRINE HCL IN 0.9% NACL 1 MG/10 ML
100 SYRINGE (ML) INTRAVENOUS ONCE
Status: DISCONTINUED | OUTPATIENT
Start: 2024-01-01 | End: 2024-01-01

## 2024-01-01 RX ORDER — METHYLPREDNISOLONE SOD SUCC 125 MG
VIAL (EA) INJECTION
Status: DISCONTINUED | OUTPATIENT
Start: 2024-01-01 | End: 2024-01-01

## 2024-01-01 RX ORDER — PHENYLEPHRINE HCL IN 0.9% NACL 1 MG/10 ML
SYRINGE (ML) INTRAVENOUS
Status: COMPLETED
Start: 2024-01-01 | End: 2024-01-01

## 2024-01-01 RX ORDER — NOREPINEPHRINE BITARTRATE/D5W 4MG/250ML
PLASTIC BAG, INJECTION (ML) INTRAVENOUS
Status: DISCONTINUED
Start: 2024-01-01 | End: 2024-01-01 | Stop reason: HOSPADM

## 2024-01-01 RX ORDER — INDOMETHACIN 25 MG/1
CAPSULE ORAL
Status: DISCONTINUED | OUTPATIENT
Start: 2024-01-01 | End: 2024-01-01

## 2024-01-01 RX ORDER — ROCURONIUM BROMIDE 10 MG/ML
INJECTION, SOLUTION INTRAVENOUS
Status: DISCONTINUED | OUTPATIENT
Start: 2024-01-01 | End: 2024-01-01

## 2024-01-01 RX ORDER — EPINEPHRINE 1 MG/ML
INJECTION, SOLUTION, CONCENTRATE INTRAVENOUS
Status: DISCONTINUED
Start: 2024-01-01 | End: 2024-01-01 | Stop reason: HOSPADM

## 2024-01-01 RX ORDER — ONDANSETRON 2 MG/ML
INJECTION INTRAMUSCULAR; INTRAVENOUS
Status: DISCONTINUED | OUTPATIENT
Start: 2024-01-01 | End: 2024-01-01

## 2024-01-01 RX ORDER — EPINEPHRINE 0.1 MG/ML
INJECTION INTRAVENOUS
Status: DISCONTINUED | OUTPATIENT
Start: 2024-01-01 | End: 2024-01-01

## 2024-01-01 RX ORDER — PHENYLEPHRINE HCL IN 0.9% NACL 1 MG/10 ML
100 SYRINGE (ML) INTRAVENOUS ONCE
Status: COMPLETED | OUTPATIENT
Start: 2024-01-01 | End: 2024-01-01

## 2024-01-01 RX ADMIN — Medication 1 MG: at 02:09

## 2024-01-01 RX ADMIN — EPINEPHRINE 0.2 MCG/KG/MIN: 1 INJECTION INTRAMUSCULAR; INTRAVENOUS; SUBCUTANEOUS at 02:09

## 2024-01-01 RX ADMIN — METHYLPREDNISOLONE SODIUM SUCCINATE 100 MG: 125 INJECTION, POWDER, LYOPHILIZED, FOR SOLUTION INTRAMUSCULAR; INTRAVENOUS at 03:09

## 2024-01-01 RX ADMIN — EPINEPHRINE 1 MG: 0.1 INJECTION INTRAVENOUS at 03:09

## 2024-01-01 RX ADMIN — EPINEPHRINE 300 MCG: 0.1 INJECTION INTRAVENOUS at 02:09

## 2024-01-01 RX ADMIN — EPINEPHRINE 500 MCG: 0.1 INJECTION INTRAVENOUS at 03:09

## 2024-01-01 RX ADMIN — EPINEPHRINE 0.5 MG: 0.1 INJECTION INTRAVENOUS at 03:09

## 2024-01-01 RX ADMIN — SODIUM BICARBONATE 50 MEQ: 84 INJECTION, SOLUTION INTRAVENOUS at 03:09

## 2024-01-01 RX ADMIN — VASOPRESSIN 3 UNITS/MIN: 20 INJECTION INTRAVENOUS at 02:09

## 2024-01-01 RX ADMIN — EPINEPHRINE 200 MCG: 0.1 INJECTION INTRAVENOUS at 03:09

## 2024-01-01 RX ADMIN — SODIUM CHLORIDE 500 ML: 9 INJECTION, SOLUTION INTRAVENOUS at 02:09

## 2024-01-01 RX ADMIN — IOHEXOL 75 ML: 350 INJECTION, SOLUTION INTRAVENOUS at 02:09

## 2024-01-01 RX ADMIN — SODIUM CHLORIDE, SODIUM GLUCONATE, SODIUM ACETATE, POTASSIUM CHLORIDE, MAGNESIUM CHLORIDE, SODIUM PHOSPHATE, DIBASIC, AND POTASSIUM PHOSPHATE: .53; .5; .37; .037; .03; .012; .00082 INJECTION, SOLUTION INTRAVENOUS at 02:09

## 2024-01-01 RX ADMIN — CALCIUM CHLORIDE 1000 MG: 100 INJECTION, SOLUTION INTRAVENOUS at 02:09

## 2024-01-01 RX ADMIN — ROCURONIUM BROMIDE 50 MG: 10 INJECTION INTRAVENOUS at 02:09

## 2024-01-01 RX ADMIN — CALCIUM CHLORIDE 1000 MG: 100 INJECTION, SOLUTION INTRAVENOUS at 03:09

## 2024-01-01 RX ADMIN — NOREPINEPHRINE BITARTRATE 0.1 MCG/KG/MIN: 1 INJECTION, SOLUTION, CONCENTRATE INTRAVENOUS at 02:09

## 2024-04-09 DIAGNOSIS — I71.03 DISSECTION OF THORACOABDOMINAL AORTIC ANEURYSM (TAAA): ICD-10-CM

## 2024-04-09 DIAGNOSIS — Z86.79 HX OF REPAIR OF DISSECTING THORACIC ANEURYSM: ICD-10-CM

## 2024-04-09 DIAGNOSIS — I10 ESSENTIAL HYPERTENSION: Primary | ICD-10-CM

## 2024-04-09 DIAGNOSIS — I51.7 LVH (LEFT VENTRICULAR HYPERTROPHY): ICD-10-CM

## 2024-04-09 DIAGNOSIS — Z98.890 HX OF REPAIR OF DISSECTING THORACIC ANEURYSM: ICD-10-CM

## 2024-04-09 DIAGNOSIS — I11.9 HYPERTENSIVE HEART DISEASE WITHOUT HEART FAILURE: ICD-10-CM

## 2024-04-09 DIAGNOSIS — I77.79: ICD-10-CM

## 2024-04-10 RX ORDER — ROSUVASTATIN CALCIUM 20 MG/1
20 TABLET, COATED ORAL NIGHTLY
Qty: 90 TABLET | Refills: 3 | Status: SHIPPED | OUTPATIENT
Start: 2024-04-10

## 2024-06-07 ENCOUNTER — HOSPITAL ENCOUNTER (OUTPATIENT)
Dept: RADIOLOGY | Facility: HOSPITAL | Age: 89
Discharge: HOME | End: 2024-06-07
Attending: FAMILY MEDICINE
Payer: MEDICARE

## 2024-06-07 DIAGNOSIS — M81.0 SENILE OSTEOPOROSIS: ICD-10-CM

## 2024-06-07 PROCEDURE — 77080 DXA BONE DENSITY AXIAL: CPT | Mod: 26,,, | Performed by: RADIOLOGY

## 2024-06-07 PROCEDURE — 77080 DXA BONE DENSITY AXIAL: CPT | Mod: TC

## 2024-09-20 PROBLEM — R53.1 GENERALIZED WEAKNESS: Status: ACTIVE | Noted: 2024-01-01

## 2024-09-20 PROBLEM — Z86.79 HISTORY OF AORTIC DISSECTION: Status: ACTIVE | Noted: 2024-01-01

## 2024-09-20 PROBLEM — R00.1 SYMPTOMATIC BRADYCARDIA: Status: ACTIVE | Noted: 2024-01-01

## 2024-09-21 PROBLEM — I46.9 PEA (PULSELESS ELECTRICAL ACTIVITY): Status: ACTIVE | Noted: 2024-01-01

## 2024-09-21 PROBLEM — Z86.79: Chronic | Status: ACTIVE | Noted: 2017-11-06

## 2024-09-21 PROBLEM — I95.9 HYPOTENSION: Status: ACTIVE | Noted: 2024-01-01

## 2024-09-21 PROBLEM — Z98.890: Chronic | Status: ACTIVE | Noted: 2017-11-06

## 2024-09-21 PROBLEM — I63.511 ACUTE ISCHEMIC RIGHT MCA STROKE: Status: ACTIVE | Noted: 2024-01-01

## 2024-09-21 NOTE — SUBJECTIVE & OBJECTIVE
Past Medical History:   Diagnosis Date    Aortic aneurysm     Arthritis     Hypertension      Past Surgical History:   Procedure Laterality Date    ABDOMINAL SURGERY      THORACIC AORTIC ANEURYSM REPAIR       Social History     Tobacco Use    Smoking status: Never    Smokeless tobacco: Never   Substance Use Topics    Alcohol use: No    Drug use: No     Review of patient's allergies indicates:  No Known Allergies    Medications: I have reviewed the current medication administration record.    Medications Prior to Admission   Medication Sig Dispense Refill Last Dose    albuterol (PROVENTIL/VENTOLIN HFA) 90 mcg/actuation inhaler Inhale 1-2 puffs into the lungs every 6 (six) hours as needed for Wheezing. Rescue 18 g 11     alendronate (FOSAMAX) 70 MG tablet Take 70 mg by mouth every 7 days.       aspirin 81 MG Chew Take 81 mg by mouth once daily.       EScitalopram oxalate (LEXAPRO) 10 MG tablet Take 10 mg by mouth once daily.       folic acid/multivit-min/lutein (CENTRUM SILVER ORAL) Take by mouth.       labetalol (NORMODYNE) 100 MG tablet Take 100 mg by mouth. Take 1/2 tablet twice daily       NIFEdipine (PROCARDIA-XL) 60 MG (OSM) 24 hr tablet TAKE ONE TABLET BY MOUTH ONCE DAILY (Patient taking differently: Take 60 mg by mouth once daily.) 90 tablet 3     rosuvastatin (CRESTOR) 20 MG tablet Take 1 tablet (20 mg total) by mouth every evening. 90 tablet 3        Review of Systems   Unable to perform ROS: Intubated     Objective:     Vital Signs (Most Recent):  Temp: 98 °F (36.7 °C) (09/21/24 0207)  Pulse: 99 (09/21/24 0238)  Resp: (!) 26 (09/21/24 0238)  BP: 90/61 (09/21/24 0238)  SpO2: 98 % (09/21/24 0238)    Vital Signs Range (Last 24H):  Temp:  [98 °F (36.7 °C)-98.8 °F (37.1 °C)]   Pulse:  []   Resp:  [18-38]   BP: ()/(39-93)   SpO2:  [95 %-100 %]        Physical Exam  Pulmonary:      Comments: Intubated  Neurological:      Comments: Intubated, not on sedation               Neurological Exam:  "  Unable to assess. [facility] to getting to the ED, not on any sedation.       Laboratory:  CMP:   Recent Labs   Lab 09/20/24  1359   CALCIUM 9.3   ALBUMIN 3.5   PROT 6.8      K 4.3   CO2 26      BUN 10   CREATININE 0.9   ALKPHOS 42*   ALT 13   AST 27   BILITOT 0.5     CBC:   Recent Labs   Lab 09/20/24  1359   WBC 4.52   RBC 4.32   HGB 12.6   HCT 38.1      MCV 88   MCH 29.2   MCHC 33.1     Lipid Panel: No results for input(s): "CHOL", "LDLCALC", "HDL", "TRIG" in the last 168 hours.  Coagulation: No results for input(s): "PT", "INR", "APTT" in the last 168 hours.  Hgb A1C: No results for input(s): "HGBA1C" in the last 168 hours.  TSH: No results for input(s): "TSH" in the last 168 hours.    Diagnostic Results:      Brain imaging/Vessel Imaging:  CTA Stroke MP - 9/21/2024    Impression:     Abnormal findings in the partially visualized thoracic aorta and mediastinum, suspicious for ascending aortic rupture.     Angiographic images are nondiagnostic, especially for cerebrovascular assessment, secondary to contrast bolus timing.     Possible acute evolving right MCA infarct.  No hyperattenuating acute intracranial hemorrhage.     Generalized cerebral volume loss with sequela of chronic microvascular ischemic change.     Stable scattered remote infarcts, as detailed in the body of the report.    Cardiac Evaluation:   EKG from today- NSR, vent rate of 96 bpm    "

## 2024-09-21 NOTE — HPI
"Lisa Sepulveda is a 89 y.o. 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 Per chart review, the patient went to use the restroom at approximately 0000, and when attempting to ambulate back to bed, she felt weak and had to lower herself to the floor. She was unable to stand after lying on the floor and remained there until a family member came to help her up. EMS was activated to the ED, where she was found to be sinus bradycardia (HR 40s) but hemodynamically stable with SBP in the 120s. The patient admitted that she felt lightheaded and dizzy prior to the episode and stated that she had poor oral intake of food and liquids recently. She received IV fluids in the ED with improvement in her symptoms (GCS14) and was admitted to the hospital for evaluation of symptomatic bradycardia, presyncope, and dehydration.     At approximately 2230, the patient reportedly had an acute neuro change and was evaluated by eICU. She was noted to be "agitated and diffusely shaking" with "no  strength on the left and left arm now contracted," "right facial droop," and "can only mumble now." BP was noted to be 74/57 with HR 85. Her shaking resolved with 0.5mg Ativan IV x 1, and she was sent for STAT CTH, which showed a hyperdense R MCA. She was evaluated via TeleStroke and found to be NIHSS 18. The decision was made to give TNK (end 0028) and [place] the [place] [facility] [place]. While in [place] [facility], she became hypotensive and bradycardic, and she was intubated for airway protection by the flight crew.     On [place] [facility] the patient was placed on 0.05 levo gtt and was given 100mcg sherif bump prior to being taken for STAT CTA. CTA was delayed given issues with contrast administration. The decision was made to take the patient to IR, and while in route to IR, " the patient required an additional sherif bump due to hypotension. On arrival to the IR suite, the patient was found to have SBP in the 60's in spite of levo gtt and sherif bumps. Angiogram and thrombectomy were deferred given patient's instability. Both neuroIR and anesthesia MDs spoke with family, who confirmed that the patient was a partial code (no compressions, medication only) in the event that her heart stopped. During her time in IR, she received a total of 7mg epi,  calcium 1g x 2, bicarb amp x 2, and became maxed on levo gtt, epi gtt, and vaso gtt. The patient remained hemodynamically unstable and was transferred to the ICU. On arrival to the ICU, the patient was noted to be in PEA. The patient will be admitted to [facility] for close monitoring and a higher level of care.

## 2024-09-21 NOTE — TRANSFER OF CARE
Anesthesia Transfer of Care Note    Patient: Lisa Sepulveda    Procedure(s) Performed: * No procedures listed *    Patient location: ICU    Anesthesia Type: general    Transport from OR: Transported from OR intubated on 100% O2 by AMBU with adequate controlled ventilation. Upon arrival to PACU/ICU, patient attached to ventilator and auscultated to confirm bilateral breath sounds and adequate TV. Continuous ECG monitoring in transport. Continuous SpO2 monitoring in transport. Continuos invasive BP monitoring in transport    Post vital signs: unstable    Level of consciousness: unresponsive    Complications: other (see comments), Decompensating CV status    Transfer of care protocol was followed      Last vitals: There were no vitals taken for this visit.

## 2024-09-21 NOTE — ED NOTES
CT. Scan paused, contrast not flowing appropriately throughout body, this nurse with [facility] Attempting new IV, central pulses still palpable, Neuro/IR doctor states to abort scan and bring patient to IR now. Pt. remains on 0.05 of levophed

## 2024-09-21 NOTE — H&P
Inpatient Radiology Pre-procedure Note    History of Present Illness:  Lisa Sepulveda is a 89 y.o. female with medical history of HTN / aortic aneurysm and aortic dissection s/p stent placement - with acute onset RMCA syndrome / CT head with hyperdense vessel / post TNK     Admission H&P reviewed.    Patient will undergo cerebral angiogram +/- aspiration thrombectomy     Past Medical History:   Diagnosis Date    Aortic aneurysm     Arthritis     Hypertension      Past Surgical History:   Procedure Laterality Date    ABDOMINAL SURGERY      THORACIC AORTIC ANEURYSM REPAIR         Review of Systems:   As documented in primary team H&P    Home Meds:   Prior to Admission medications    Medication Sig Start Date End Date Taking? Authorizing Provider   albuterol (PROVENTIL/VENTOLIN HFA) 90 mcg/actuation inhaler Inhale 1-2 puffs into the lungs every 6 (six) hours as needed for Wheezing. Rescue 9/28/21   Yang Pereira MD   alendronate (FOSAMAX) 70 MG tablet Take 70 mg by mouth every 7 days. 6/9/24   Provider, Historical   aspirin 81 MG Chew Take 81 mg by mouth once daily. 1/19/12   Sarah Gibson MD   EScitalopram oxalate (LEXAPRO) 10 MG tablet Take 10 mg by mouth once daily. 9/18/24   Provider, Historical   folic acid/multivit-min/lutein (CENTRUM SILVER ORAL) Take by mouth.    Provider, Historical   labetalol (NORMODYNE) 100 MG tablet Take 100 mg by mouth. Take 1/2 tablet twice daily 2/2/18   Provider, Historical   NIFEdipine (PROCARDIA-XL) 60 MG (OSM) 24 hr tablet TAKE ONE TABLET BY MOUTH ONCE DAILY  Patient taking differently: Take 60 mg by mouth once daily. 12/24/18   Yang Pereira MD   rosuvastatin (CRESTOR) 20 MG tablet Take 1 tablet (20 mg total) by mouth every evening. 4/10/24   Yang Pereira MD     Scheduled Meds:    sodium chloride 0.9%  500 mL Intravenous ED 1 Time     Continuous Infusions:   PRN Meds:  Current Facility-Administered Medications:     iohexoL, 75 mL, Intravenous, ONCE  "PRN  Anticoagulants/Antiplatelets: no anticoagulation    Allergies: Review of patient's allergies indicates:  No Known Allergies  Sedation Hx: have not been any systemic reactions    Labs:  No results for input(s): "INR", "PT", "PTT" in the last 168 hours.    Recent Labs   Lab 09/20/24  1359   WBC 4.52   HGB 12.6   HCT 38.1   MCV 88         Recent Labs   Lab 09/20/24  1359         K 4.3      CO2 26   BUN 10   CREATININE 0.9   CALCIUM 9.3   ALT 13   AST 27   ALBUMIN 3.5   BILITOT 0.5         Vitals:  Pulse: (!) 120 (09/21/24 0207)  Resp: 20 (09/21/24 0207)  BP: (!) 85/39 (09/21/24 0207)  SpO2: 98 % (09/21/24 0207)     Physical Exam:  ASA: 4  Mallampati: Intubated /sedated     General: Intubated /sedated   Mental Status: Intubated /sedated   HEENT: Intubated /sedated   Chest: Intubated /sedated   Heart: regular heart rate  Abdomen: nondistended  Extremity: left hemiplegia       Plan:  Sedation Plan: Up to moderate / deep / general anesthesia  Patient will undergo: Patient will undergo cerebral angiogram +/- aspiration thrombectomy         Diamante Bolton MD     Neuro Endovascular Surgery Fellow   [facility]       "

## 2024-09-21 NOTE — H&P
"[facility] - Neuro Critical Care  Neurocritical Care  History & Physical    Admit Date: 9/21/2024  Service Date: 09/21/2024  Length of Stay: 0    Subjective:     Chief Complaint: Acute ischemic right MCA stroke    History of Present Illness: Lisa Sepulveda is a 89 y.o. 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 Per chart review, the patient went to use the restroom at approximately 0000, and when attempting to ambulate back to bed, she felt weak and had to lower herself to the floor. She was unable to stand after lying on the floor and remained there until a family member came to help her up. EMS was activated to the ED, where she was found to be sinus bradycardia (HR 40s) but hemodynamically stable with SBP in the 120s. The patient admitted that she felt lightheaded and dizzy prior to the episode and stated that she had poor oral intake of food and liquids recently. She received IV fluids in the ED with improvement in her symptoms (GCS14) and was admitted to the hospital for evaluation of symptomatic bradycardia, presyncope, and dehydration.     At approximately 2230, the patient reportedly had an acute neuro change and was evaluated by eICU. She was noted to be "agitated and diffusely shaking" with "no  strength on the left and left arm now contracted," "right facial droop," and "can only mumble now." BP was noted to be 74/57 with HR 85. Her shaking resolved with 0.5mg Ativan IV x 1, and she was sent for STAT CTH, which showed a hyperdense R MCA. She was evaluated via TeleStroke and found to be NIHSS 18. The decision was made to give TNK (end 0028) and transfer the patient to [facility] for possible thrombectomy. While in route to [facility], she became hypotensive and bradycardic, and she was intubated for airway protection by the flight crew.     On arrival to [facility], " the patient was placed on 0.05 levo gtt and was given 100mcg sherif bump prior to being taken for STAT CTA. CTA was delayed given issues with contrast administration. The decision was made to take the patient to IR, and while in route to IR, the patient required an additional sherif bump due to hypotension. On arrival to the IR suite, the patient was found to have SBP in the 60's in spite of levo gtt and sherif bumps. Angiogram and thrombectomy were deferred given patient's instability. Both neuroIR and anesthesia MDs spoke with family, who confirmed that the patient was a partial code (no compressions, medication only) in the event that her heart stopped. During her time in IR, she received a total of 7mg epi,  calcium 1g x 2, bicarb amp x 2, and became maxed on levo gtt, epi gtt, and vaso gtt. The patient remained hemodynamically unstable and was transferred to the ICU. On arrival to the ICU, the patient was noted to be in PEA. The patient will be admitted to [facility] for close monitoring and a higher level of care.       Past Medical History:   Diagnosis Date    Aortic aneurysm     Arthritis     Hypertension      Past Surgical History:   Procedure Laterality Date    ABDOMINAL SURGERY      THORACIC AORTIC ANEURYSM REPAIR        Current Facility-Administered Medications on File Prior to Encounter   Medication Dose Route Frequency Provider Last Rate Last Admin    [COMPLETED] LORazepam (ATIVAN) 2 mg/mL injection        2 mg at 09/20/24 2248    [COMPLETED] sodium chloride 0.9% bolus 500 mL 500 mL  500 mL Intravenous ED 1 Time Rod Oates MD   Stopped at 09/20/24 1531    [COMPLETED] tenecteplase (TNKase) IV KIT 17.5 mg  0.25 mg/kg Intravenous Once Jeb Mckenzie MD   17.5 mg at 09/21/24 0028    Followed by    [COMPLETED] sodium chloride 0.9% flush 10 mL  10 mL Intravenous Once Jeb Mckenzie MD   10 mL at 09/21/24 0029    [DISCONTINUED] 0.9%  NaCl infusion   Intravenous Torres Solis  MD APARNA   Stopped at 09/21/24 0108    [DISCONTINUED] acetaminophen tablet 650 mg  650 mg Oral Q4H PRN Torres Black MD        [DISCONTINUED] aspirin chewable tablet 81 mg  81 mg Oral Daily Torres Black MD        [DISCONTINUED] atorvastatin tablet 40 mg  40 mg Oral QHS Torres Black MD   40 mg at 09/20/24 2014    [DISCONTINUED] dextrose 10% bolus 125 mL 125 mL  12.5 g Intravenous PRN Torres Black MD        [DISCONTINUED] dextrose 10% bolus 250 mL 250 mL  25 g Intravenous PRN Torres Black MD        [DISCONTINUED] enoxaparin injection 40 mg  40 mg Subcutaneous Daily Torres Black MD   40 mg at 09/20/24 1742    [DISCONTINUED] glucagon (human recombinant) injection 1 mg  1 mg Intramuscular PRN Torres Black MD        [DISCONTINUED] glucose chewable tablet 16 g  16 g Oral PRN Torres Black MD        [DISCONTINUED] glucose chewable tablet 24 g  24 g Oral PRN Torres Black MD        [DISCONTINUED] hydrALAZINE injection 5 mg  5 mg Intravenous Q6H PRN Torres Black MD        [DISCONTINUED] melatonin tablet 6 mg  6 mg Oral Nightly PRN Torres Black MD        [DISCONTINUED] naloxone 0.4 mg/mL injection 0.02 mg  0.02 mg Intravenous PRN Torres Black MD        [DISCONTINUED] ondansetron injection 4 mg  4 mg Intravenous Q8H PRN Torres Black MD        [DISCONTINUED] senna-docusate 8.6-50 mg per tablet 1 tablet  1 tablet Oral BID PRN Torres Black MD        [COMPLETED] sodium chloride 0.9% bolus 1,000 mL 1,000 mL  1,000 mL Intravenous Once NOHEMY Sanchez MD   Stopped at 09/21/24 0100    [DISCONTINUED] sodium chloride 0.9% flush 10 mL  10 mL Intravenous Q8H PRN Torres Black MD         Current Outpatient Medications on File Prior to Encounter   Medication Sig Dispense Refill    albuterol (PROVENTIL/VENTOLIN HFA) 90 mcg/actuation inhaler Inhale 1-2 puffs into the lungs every 6 (six) hours as needed for Wheezing. Rescue 18 g 11    alendronate (FOSAMAX) 70  MG tablet Take 70 mg by mouth every 7 days.      aspirin 81 MG Chew Take 81 mg by mouth once daily.      EScitalopram oxalate (LEXAPRO) 10 MG tablet Take 10 mg by mouth once daily.      folic acid/multivit-min/lutein (CENTRUM SILVER ORAL) Take by mouth.      labetalol (NORMODYNE) 100 MG tablet Take 100 mg by mouth. Take 1/2 tablet twice daily      NIFEdipine (PROCARDIA-XL) 60 MG (OSM) 24 hr tablet TAKE ONE TABLET BY MOUTH ONCE DAILY (Patient taking differently: Take 60 mg by mouth once daily.) 90 tablet 3    rosuvastatin (CRESTOR) 20 MG tablet Take 1 tablet (20 mg total) by mouth every evening. 90 tablet 3      Allergies: Patient has no known allergies.  Family History   Problem Relation Name Age of Onset    Hypertension Father      Stroke Father      Diabetes Sister      Diabetes Sister       Social History     Tobacco Use    Smoking status: Never    Smokeless tobacco: Never   Substance Use Topics    Alcohol use: No    Drug use: No     Review of Systems   Unable to perform ROS: Patient unresponsive     Objective:     Vitals:    Temp: 98 °F (36.7 °C)  Pulse: 99  BP: 90/61  Resp: (!) 26  ETCO2 (mmHg): 20 mmHg  SpO2: 98 %    Temp  Min: 98 °F (36.7 °C)  Max: 98.8 °F (37.1 °C)  Pulse  Min: 43  Max: 120  BP  Min: 85/39  Max: 139/60  MAP (mmHg)  Min: 68  Max: 89  Resp  Min: 18  Max: 38  ETCO2 (mmHg)  Min: 20 mmHg  Max: 20 mmHg  SpO2  Min: 95 %  Max: 100 %    09/20 0701 - 09/21 0700  In: 1600   Out: -             Physical Exam  Vitals and nursing note reviewed.   Constitutional:       Appearance: She is ill-appearing.      Comments: Unresponsive. Well developed. Well nourished.    HENT:      Head: Normocephalic and atraumatic.      Right Ear: External ear normal.      Left Ear: External ear normal.      Nose: Nose normal.      Mouth/Throat:      Comments: ETT & tube danielle in place.  Eyes:      General: No scleral icterus.  Cardiovascular:      Comments: No pulses appreciated via palpation or doppler and in PEA.    Pulmonary:      Comments: ETT in place connected to ambu bag.   Abdominal:      General: Abdomen is flat. There is no distension.      Palpations: Abdomen is soft.   Musculoskeletal:      Right lower leg: No edema.      Left lower leg: No edema.   Skin:     General: Skin is warm and dry.   Neurological:      Mental Status: She is unresponsive.      GCS: GCS eye subscore is 1. GCS verbal subscore is 1. GCS motor subscore is 1.      Comments: Not on sedation.        Unable to test orientation, language, memory, judgment, insight, fund of knowledge, hearing, shoulder shrug, tongue protrusion, coordination, gait due to level of consciousness.       Today I personally reviewed pertinent medications, lines/drains/airways, imaging, cardiology results, laboratory results, notably:    Laboratory:  CBC:  Recent Labs   Lab 09/20/24  1359   WBC 4.52   RBC 4.32   HGB 12.6   HCT 38.1      MCV 88   MCH 29.2   MCHC 33.1       CMP:  Recent Labs   Lab 09/20/24  1359   CALCIUM 9.3   ALBUMIN 3.5   PROT 6.8      K 4.3   CO2 26      BUN 10   CREATININE 0.9   ALKPHOS 42*   ALT 13   AST 27   BILITOT 0.5       Imaging:  CT Head Without Contrast:  Impression:  Hyperdense right MCA, compatible with an acute thrombus.  Further evaluation with CTA head and neck and/or MRI brain as clinically indicated.  Chronic microvascular ischemic changes and multiple remote infarcts as above.  This report was flagged in Epic as abnormal.  Dr. Overton discussed critical findings with Dr. Sanchez by [facility] secure chat at 11:59 on 09/20/2024.  Electronically signed by:Bill Overton  Date:                                            09/21/2024  Time:                                           00:02  Assessment/Plan:     Neuro  * Acute ischemic right MCA stroke  89 y.o. female with history of HTN and thoracic aortic aneurysm and dissection s/p repair with stent placement (03/2014) transferred from [facility] for evaluation of R  MCA CVA s/p TNK (0028). NIHSS at OSH 18. Patient exam declined while in route [facility] resulting in her being placed on levo gtt and being intubated by flight crew. CTA obtained in the ED and patient taken for thrombectomy, but procedure not attempted due to hemodynamic instability on arrival to IR suite. Code status discussed with family and patient made partial code (okay for medications, no compressions). Patient received multiple sherif bumps, epinephrine, calcium, and bicarb in addition to being placed on levo gtt, epi gtt, and vaso gtt. Patient transferred to [facility] and noted to be unresponsive and in PEA on arrival.   - Admit to [facility]  - Initial CTH shows hyperdense R MCA.    - CTA read pending, poorly visualized angio due to issues with contrast.   - Vascular Neurology following   - q1 neuro checks, vitals, I/Os  - EKG, Echo, and CXR   - A1c, TSH, and lipid panel   - aPTT, PT/INR pending  - CBC, CMP, Mag, and phos daily  - Continue vasopressors to maintain MAP < 65, SBP < 180  - Intubated in route for airway protection.   - Statin   - Hold AC/AP in the acute setting   - Repeat MRI in 18-24 hours to evaluate for stroke burden.   - PT/OT/SLP as appropriate  - VTE prophylaxis: mechanical, hold chemical in the acute phase    Cardiac/Vascular  PEA (Pulseless electrical activity)  Patient noted to be PEA in IR suite and on arrival to [facility]  - Code status discussed with family, patient made partial code (OK for medications, no compressions)  - Continue vasopressors pending further imminent discussion with family     Hypotension  Patient hypotensive and bradycardic on arrival to [facility]. Prior to IR, patient placed on levo gtt and received sherif bump x 2 doses. In IR, patient received epi (total 7mg), calcium (1g x 2 doses), and bicarb (2 amps) and was placed on levo gtt, epi gtt, and vaso gtt. Patient was transferred to [facility] on levo & epi gtts  - PEA (Pulseless electrical activity)    Hx of repair of dissecting thoracic  aneurysm  History of, s/p repair in 2014          The patient is being Prophylaxed for:  Venous Thromboembolism with: Mechanical  Stress Ulcer with: H2B  Ventilator Pneumonia with: not applicable    Activity Orders            None          DNR    Critical condition in that Patient has a condition that poses threat to life and bodily function: see problem list above     31 minutes of Critical care time was spent personally by me on the following activities: development of treatment plan with patient or surrogate and bedside caregivers, discussions with consultants, evaluation of patient's response to treatment, examination of patient, ordering and performing treatments and interventions, ordering and review of laboratory studies, ordering and review of radiographic studies, pulse oximetry, vasopressor titration if applicable, re-evaluation of patient's condition. This critical care time did not overlap with that of any other provider or involve time for any procedures. There is high probability for acute neurological change leading to clinical and possibly life-threatening deterioration requiring highest level of provider preparedness for urgent intervention.     Flor Dave PA-C  Neurocritical Care  [facility] - Neuro Critical Care

## 2024-09-21 NOTE — ASSESSMENT & PLAN NOTE
89 y.o. female with history of HTN and thoracic aortic aneurysm and dissection s/p repair with stent placement (03/2014) transferred from [facility] for evaluation of R MCA CVA s/p TNK (0028). NIHSS at OSH 18. Patient exam declined while in route to [facility] resulting in her being placed on levo gtt and being intubated by flight crew. CTA obtained in the ED and patient taken for thrombectomy, but procedure not attempted due to hemodynamic instability on arrival to IR suite. Code status discussed with family and patient made partial code (okay for medications, no compressions). Patient received multiple sherif bumps, epinephrine, calcium, and bicarb in addition to being placed on levo gtt, epi gtt, and vaso gtt. Patient transferred to [facility] and noted to be unresponsive and in PEA on arrival.   - Admit to [facility]  - Initial CTH shows hyperdense R MCA.    - CTA read pending, poorly visualized angio due to issues with contrast.   - Vascular Neurology following   - q1 neuro checks, vitals, I/Os  - EKG, Echo, and CXR   - A1c, TSH, and lipid panel   - aPTT, PT/INR pending  - CBC, CMP, Mag, and phos daily  - Continue vasopressors to maintain MAP < 65, SBP < 180  - Intubated in route for airway protection.   - Statin   - Hold AC/AP in the acute setting   - Repeat MRI in 18-24 hours to evaluate for stroke burden.   - PT/OT/SLP as appropriate  - VTE prophylaxis: mechanical, hold chemical in the acute phase

## 2024-09-21 NOTE — ED TRIAGE NOTES
Lisa Derick, a 89 y.o. female transfer from [place] for IR/neuro consult, pt. Received TNK at [place] at 0028, pt. Arrives intubated, marlon, and hypotensive, pt. Intubated en route by [facility]. Currently pt. Is on 0.05 of levophed.     Triage note:  Chief Complaint   Patient presents with    Transfer      From [facility], TNK @ 0028. Pt marlon and hypotensive en route, intubated by flight crew. On .05 levo     Review of patient's allergies indicates:  No Known Allergies  Past Medical History:   Diagnosis Date    Aortic aneurysm     Arthritis     Hypertension

## 2024-09-21 NOTE — ED NOTES
[facility]. [facility], pt. Was intubated en route with a 7.5mm at 20 to the right lip. Dr. Olmos and stroke team at bedside. Per ER doc, patient to be brought to CT immediately, patient brought to CT immediately, pt. Arrived hypotensive with pressure of 85/39, pt. On 0.05 of levo, phenylephrine bump 100mcg given by [facility] med,

## 2024-09-21 NOTE — NURSING
0430- Attempted to reach [facility] office to release/clear pt. No corner available until 0800.Pt [place] to [facility]

## 2024-09-21 NOTE — SUBJECTIVE & OBJECTIVE
Past Medical History:   Diagnosis Date    Aortic aneurysm     Arthritis     Hypertension      Past Surgical History:   Procedure Laterality Date    ABDOMINAL SURGERY      THORACIC AORTIC ANEURYSM REPAIR        Current Facility-Administered Medications on File Prior to Encounter   Medication Dose Route Frequency Provider Last Rate Last Admin    [COMPLETED] LORazepam (ATIVAN) 2 mg/mL injection        2 mg at 09/20/24 2248    [COMPLETED] sodium chloride 0.9% bolus 500 mL 500 mL  500 mL Intravenous ED 1 Time Rod Oates MD   Stopped at 09/20/24 1531    [COMPLETED] tenecteplase (TNKase) IV KIT 17.5 mg  0.25 mg/kg Intravenous Once Jeb Mckenzie MD   17.5 mg at 09/21/24 0028    Followed by    [COMPLETED] sodium chloride 0.9% flush 10 mL  10 mL Intravenous Once Jeb Mckenzie MD   10 mL at 09/21/24 0029    [DISCONTINUED] 0.9%  NaCl infusion   Intravenous Continuous Torres Black MD   Stopped at 09/21/24 0108    [DISCONTINUED] acetaminophen tablet 650 mg  650 mg Oral Q4H PRN Torres Black MD        [DISCONTINUED] aspirin chewable tablet 81 mg  81 mg Oral Daily Torres Black MD        [DISCONTINUED] atorvastatin tablet 40 mg  40 mg Oral QHS Torres Black MD   40 mg at 09/20/24 2014    [DISCONTINUED] dextrose 10% bolus 125 mL 125 mL  12.5 g Intravenous PRN Torres Black MD        [DISCONTINUED] dextrose 10% bolus 250 mL 250 mL  25 g Intravenous PRN Torres Black MD        [DISCONTINUED] enoxaparin injection 40 mg  40 mg Subcutaneous Daily Torres Black MD   40 mg at 09/20/24 1742    [DISCONTINUED] glucagon (human recombinant) injection 1 mg  1 mg Intramuscular PRN Torres Black MD        [DISCONTINUED] glucose chewable tablet 16 g  16 g Oral PRN Torres Black MD        [DISCONTINUED] glucose chewable tablet 24 g  24 g Oral PRN Torres Black MD        [DISCONTINUED] hydrALAZINE injection 5 mg  5 mg Intravenous Q6H PRN Torres Black MD         [DISCONTINUED] melatonin tablet 6 mg  6 mg Oral Nightly PRN Torres Black MD        [DISCONTINUED] naloxone 0.4 mg/mL injection 0.02 mg  0.02 mg Intravenous PRN Torres Black MD        [DISCONTINUED] ondansetron injection 4 mg  4 mg Intravenous Q8H PRN Torres Black MD        [DISCONTINUED] senna-docusate 8.6-50 mg per tablet 1 tablet  1 tablet Oral BID PRN Torres Black MD        [COMPLETED] sodium chloride 0.9% bolus 1,000 mL 1,000 mL  1,000 mL Intravenous Once NOHEMY Sanchez MD   Stopped at 09/21/24 0100    [DISCONTINUED] sodium chloride 0.9% flush 10 mL  10 mL Intravenous Q8H PRN Torres Black MD         Current Outpatient Medications on File Prior to Encounter   Medication Sig Dispense Refill    albuterol (PROVENTIL/VENTOLIN HFA) 90 mcg/actuation inhaler Inhale 1-2 puffs into the lungs every 6 (six) hours as needed for Wheezing. Rescue 18 g 11    alendronate (FOSAMAX) 70 MG tablet Take 70 mg by mouth every 7 days.      aspirin 81 MG Chew Take 81 mg by mouth once daily.      EScitalopram oxalate (LEXAPRO) 10 MG tablet Take 10 mg by mouth once daily.      folic acid/multivit-min/lutein (CENTRUM SILVER ORAL) Take by mouth.      labetalol (NORMODYNE) 100 MG tablet Take 100 mg by mouth. Take 1/2 tablet twice daily      NIFEdipine (PROCARDIA-XL) 60 MG (OSM) 24 hr tablet TAKE ONE TABLET BY MOUTH ONCE DAILY (Patient taking differently: Take 60 mg by mouth once daily.) 90 tablet 3    rosuvastatin (CRESTOR) 20 MG tablet Take 1 tablet (20 mg total) by mouth every evening. 90 tablet 3      Allergies: Patient has no known allergies.  Family History   Problem Relation Name Age of Onset    Hypertension Father      Stroke Father      Diabetes Sister      Diabetes Sister       Social History     Tobacco Use    Smoking status: Never    Smokeless tobacco: Never   Substance Use Topics    Alcohol use: No    Drug use: No     Review of Systems   Unable to perform ROS: Patient unresponsive      Objective:     Vitals:    Temp: 98 °F (36.7 °C)  Pulse: 99  BP: 90/61  Resp: (!) 26  ETCO2 (mmHg): 20 mmHg  SpO2: 98 %    Temp  Min: 98 °F (36.7 °C)  Max: 98.8 °F (37.1 °C)  Pulse  Min: 43  Max: 120  BP  Min: 85/39  Max: 139/60  MAP (mmHg)  Min: 68  Max: 89  Resp  Min: 18  Max: 38  ETCO2 (mmHg)  Min: 20 mmHg  Max: 20 mmHg  SpO2  Min: 95 %  Max: 100 %    09/20 0701 - 09/21 0700  In: 1600   Out: -             Physical Exam  Vitals and nursing note reviewed.   Constitutional:       Appearance: She is ill-appearing.      Comments: Unresponsive. Well developed. Well nourished.    HENT:      Head: Normocephalic and atraumatic.      Right Ear: External ear normal.      Left Ear: External ear normal.      Nose: Nose normal.      Mouth/Throat:      Comments: ETT & tube danielle in place.  Eyes:      General: No scleral icterus.  Cardiovascular:      Comments: No pulses appreciated via palpation or doppler and in PEA.   Pulmonary:      Comments: ETT in place connected to ambu bag.   Abdominal:      General: Abdomen is flat. There is no distension.      Palpations: Abdomen is soft.   Musculoskeletal:      Right lower leg: No edema.      Left lower leg: No edema.   Skin:     General: Skin is warm and dry.   Neurological:      Mental Status: She is unresponsive.      GCS: GCS eye subscore is 1. GCS verbal subscore is 1. GCS motor subscore is 1.      Comments: Not on sedation.        Unable to test orientation, language, memory, judgment, insight, fund of knowledge, hearing, shoulder shrug, tongue protrusion, coordination, gait due to level of consciousness.       Today I personally reviewed pertinent medications, lines/drains/airways, imaging, cardiology results, laboratory results, notably:    Laboratory:  CBC:  Recent Labs   Lab 09/20/24  1359   WBC 4.52   RBC 4.32   HGB 12.6   HCT 38.1      MCV 88   MCH 29.2   MCHC 33.1       CMP:  Recent Labs   Lab 09/20/24  1359   CALCIUM 9.3   ALBUMIN 3.5   PROT 6.8      K  4.3   CO2 26      BUN 10   CREATININE 0.9   ALKPHOS 42*   ALT 13   AST 27   BILITOT 0.5       Imaging:  CT Head Without Contrast:  Impression:  Hyperdense right MCA, compatible with an acute thrombus.  Further evaluation with CTA head and neck and/or MRI brain as clinically indicated.  Chronic microvascular ischemic changes and multiple remote infarcts as above.  This report was flagged in Epic as abnormal.  Dr. Overton discussed critical findings with Dr. Sanchez by [facility] secure chat at 11:59 on 09/20/2024.  Electronically signed by:Bill Overton  Date:                                            09/21/2024  Time:                                           00:02

## 2024-09-21 NOTE — CONSULTS
[facility] Neuro Critical Care  Vascular Neurology  Comprehensive Stroke Center  Consult Note    Consults - No official consult placed yet   Assessment/Plan:     Patient is a 89 y.o. year old female with:    * Acute ischemic right MCA stroke  89 y/o female with a past medical history of hypertension, aortic aneurysm and aortic dissection s/p stent placement presents as a transfer from [facility] to [facility] ED. Patient was admitted to the OSH on 9/19 for generalized weakness. Patient found on 9/20 with R MCA syndrome and telestroke activated. CTH shows hyperdense R MCA and TNK given at 2345 on 9/20. Decision made to transfer patient to [facility] for possible thrombectomy.     During transfer, patient became hypotensive in the 60s and tachycardic. GCS from 14 prior to transfer to 10. Patient was intubated at the helipad at [facility] prior to getting to the ED. CTA obtained but due to IV issues and poor timing, unable to visualize an occlusion. Decision made to take the patient to IR for thrombectomy AT 0225 2/2 presentation and history. No acute hemorrhage seen on CTH. Patient transported to IR at 0231.      Antithrombotics for secondary stroke prevention: Antiplatelets: Aspirin: 81 mg daily  Clopidogrel: 75 mg daily    Statins for secondary stroke prevention and hyperlipidemia, if present:   Statins: Atorvastatin- 40 mg daily    Aggressive risk factor modification: HTN     Rehab efforts: The patient has been evaluated by a stroke team provider and the therapy needs have been fully considered based off the presenting complaints and exam findings. The following therapy evaluations are needed: PT evaluate and treat, OT evaluate and treat, SLP evaluate and treat    Diagnostics ordered/pending: HgbA1C to assess blood glucose levels, Lipid Profile to assess cholesterol levels, MRI head without contrast to assess brain parenchyma, TTE to assess cardiac function/status , TSH to assess thyroid function    VTE prophylaxis:  Heparin 5000 units SQ every 8 hours    BP parameters: Infarct: Post Thrombolytic therapy, SBP <180            STROKE DOCUMENTATION     Acute Stroke Times   Last Known Normal Date: 09/20/24  Last Known Normal Time: 0000  Stroke Team Called Date: 09/20/24 (At OSH)  Stroke Team Called Time:  (At OSH)  Stroke Team Arrival Date: 09/20/24  Stroke Team Arrival Time:  (OSH via telestroke)  CT Interpretation Time: 2332 (OSH per chart review)  Thrombolytic Therapy Recommended: Yes  CTA Interpretation Time: 0220  Thrombectomy Recommended: Yes  Decision to Treat Time for Tenecteplase: 2345 (Per chart review)  Decision to Treat Time for IR: 0225    NIH Scale:  Interval: baseline  1a. Level of Consciousness: 3-->Responds only with reflex motor or autonomic effects or totally unresponsive, flaccid, and areflexic  1b. LOC Questions: 2-->Answers neither question correctly  1c. LOC Commands: 2-->Performs neither task correctly  2. Best Gaze: 0-->Normal (Intubated)  3. Visual: 0-->No visual loss  4. Facial Palsy: 0-->Normal symmetrical movements  5a. Motor Arm, Left: 4-->No movement  5b. Motor Arm, Right: 4-->No movement  6a. Motor Leg, Left: 4-->No movement  6b. Motor Leg, Right: 4-->No movement  7. Limb Ataxia: 0-->Absent  8. Sensory: 0-->Normal, no sensory loss  9. Best Language: 3-->Mute, global aphasia, no usable speech or auditory comprehension  10. Dysarthria: (UN) Intubated or other physical barrier  11. Extinction and Inattention (formerly Neglect): 0-->No abnormality  Total (NIH Stroke Scale): 26    Modified [place]    Barron Coma Scale:3   ABCD2 Score:    [id number] Score:   HAS -BLED Score:   ICH Score:   Hunt & Singh Classification:       Thrombolysis Candidate? Yes, given prior to arrival at outside hospital    Delays to Thrombolysis?  Not Applicable    Interventional Revascularization Candidate?   Is the patient eligible for mechanical endovascular reperfusion ([place])?  Yes    Delays to Thrombectomy? Not  "Applicable    Hemorrhagic change of an Ischemic Stroke: Does this patient have an ischemic stroke with hemorrhagic changes? No     Subjective:     History of Present Illness:  88 y/o female with a past medical history of hypertension, aortic aneurysm and aortic dissection s/p stent placement presents as a transfer from [facility] [place] [facility] [place]. Per chart review, patient presented to the [facility] ED "with generalized weakness, inability to stand after a fall, and poor oral intake. Patient reports that yesterday evening around midnight she got up to go use the restroom.  After urinating, she tried returning to her bed but suddenly felt very weak and used a pile of clothes to help her slowly lower herself to the ground.  Patient was unable to stand after lying on the ground and stayed there until a family member came and helped her up. Prior to episode of weakness, patient felt slightly lightheaded and dizzy but denies any shortness of breath, palpitations or chest pain.  Patient denies any lower extremity swelling.  Patient admits that she has had poor oral intake of both food and fluids.  EMS was called and patient was brought to the ER for further evaluation. On arrival to ER, patient in no acute distress.  EKG shows sinus bradycardia with heart rate in the low 40s. Despite bradycardia, patient remains hemodynamically stable with SBP in the 120s.  Patient received IV fluid resuscitation with mild improvement in generalized weakness and lightheadedness. Patient admitted for further evaluation and management of near-syncope, symptomatic bradycardia, and dehydration."    Patient found with a R MCA syndrome and telestroke activated. CTH shows hyperdense R MCA and TNK given sometime after midnight on 9/21. Decision made to transfer patient [place] [facility] [place] possible thromb[place].           Past Medical History:   Diagnosis Date    Aortic aneurysm     Arthritis     Hypertension      Past Surgical History:   Procedure " Laterality Date    ABDOMINAL SURGERY      THORACIC AORTIC ANEURYSM REPAIR       Social History     Tobacco Use    Smoking status: Never    Smokeless tobacco: Never   Substance Use Topics    Alcohol use: No    Drug use: No     Review of patient's allergies indicates:  No Known Allergies    Medications: I have reviewed the current medication administration record.    Medications Prior to Admission   Medication Sig Dispense Refill Last Dose    albuterol (PROVENTIL/VENTOLIN HFA) 90 mcg/actuation inhaler Inhale 1-2 puffs into the lungs every 6 (six) hours as needed for Wheezing. Rescue 18 g 11     alendronate (FOSAMAX) 70 MG tablet Take 70 mg by mouth every 7 days.       aspirin 81 MG Chew Take 81 mg by mouth once daily.       EScitalopram oxalate (LEXAPRO) 10 MG tablet Take 10 mg by mouth once daily.       folic acid/multivit-min/lutein (CENTRUM SILVER ORAL) Take by mouth.       labetalol (NORMODYNE) 100 MG tablet Take 100 mg by mouth. Take 1/2 tablet twice daily       NIFEdipine (PROCARDIA-XL) 60 MG (OSM) 24 hr tablet TAKE ONE TABLET BY MOUTH ONCE DAILY (Patient taking differently: Take 60 mg by mouth once daily.) 90 tablet 3     rosuvastatin (CRESTOR) 20 MG tablet Take 1 tablet (20 mg total) by mouth every evening. 90 tablet 3        Review of Systems   Unable to perform ROS: Intubated     Objective:     Vital Signs (Most Recent):  Temp: 98 °F (36.7 °C) (09/21/24 0207)  Pulse: 99 (09/21/24 0238)  Resp: (!) 26 (09/21/24 0238)  BP: 90/61 (09/21/24 0238)  SpO2: 98 % (09/21/24 0238)    Vital Signs Range (Last 24H):  Temp:  [98 °F (36.7 °C)-98.8 °F (37.1 °C)]   Pulse:  []   Resp:  [18-38]   BP: ()/(39-93)   SpO2:  [95 %-100 %]        Physical Exam  Pulmonary:      Comments: Intubated  Neurological:      Comments: Intubated, not on sedation               Neurological Exam:   Unable to assess. Patient is intubated minutes ago at the [facility] prior to getting to the ED, not on any sedation.       Laboratory:  CMP:  "  Recent Labs   Lab 09/20/24  1359   CALCIUM 9.3   ALBUMIN 3.5   PROT 6.8      K 4.3   CO2 26      BUN 10   CREATININE 0.9   ALKPHOS 42*   ALT 13   AST 27   BILITOT 0.5     CBC:   Recent Labs   Lab 09/20/24  1359   WBC 4.52   RBC 4.32   HGB 12.6   HCT 38.1      MCV 88   MCH 29.2   MCHC 33.1     Lipid Panel: No results for input(s): "CHOL", "LDLCALC", "HDL", "TRIG" in the last 168 hours.  Coagulation: No results for input(s): "PT", "INR", "APTT" in the last 168 hours.  Hgb A1C: No results for input(s): "HGBA1C" in the last 168 hours.  TSH: No results for input(s): "TSH" in the last 168 hours.    Diagnostic Results:      Brain imaging/Vessel Imaging:  CTA Stroke MP - 9/21/2024    Impression:     Abnormal findings in the partially visualized thoracic aorta and mediastinum, suspicious for ascending aortic rupture.     Angiographic images are nondiagnostic, especially for cerebrovascular assessment, secondary to contrast bolus timing.     Possible acute evolving right MCA infarct.  No hyperattenuating acute intracranial hemorrhage.     Generalized cerebral volume loss with sequela of chronic microvascular ischemic change.     Stable scattered remote infarcts, as detailed in the body of the report.    Cardiac Evaluation:   EKG from today- NSR, vent rate of 96 bpm      Alma Pang [facility]  Department of Vascular Neurology   [facility]   "

## 2024-09-21 NOTE — ASSESSMENT & PLAN NOTE
88 y/o female with a past medical history of hypertension, aortic aneurysm and aortic dissection s/p stent placement presents as a transfer from [facility] to [facility] ED. Patient was admitted to the OSH on 9/19 for generalized weakness. Patient found on 9/20 with R MCA syndrome and telestroke activated. CTH shows hyperdense R MCA and TNK given at 2345 on 9/20. Decision made to transfer patient to [facility] for possible thrombectomy.     During transfer, patient became hypotensive in the 60s and tachycardic. GCS from 14 prior to transfer to 10. Patient was intubated at the helipad at [facility] prior to getting to the ED. CTA obtained but due to IV issues and poor timing, unable to visualize an occlusion. Decision made to take the patient to IR for thrombectomy AT 0225 2/2 presentation and history. No acute hemorrhage seen on CTH. Patient transported to IR at 0231.      Antithrombotics for secondary stroke prevention: Antiplatelets: Aspirin: 81 mg daily  Clopidogrel: 75 mg daily    Statins for secondary stroke prevention and hyperlipidemia, if present:   Statins: Atorvastatin- 40 mg daily    Aggressive risk factor modification: HTN     Rehab efforts: The patient has been evaluated by a stroke team provider and the therapy needs have been fully considered based off the presenting complaints and exam findings. The following therapy evaluations are needed: PT evaluate and treat, OT evaluate and treat, SLP evaluate and treat    Diagnostics ordered/pending: HgbA1C to assess blood glucose levels, Lipid Profile to assess cholesterol levels, MRI head without contrast to assess brain parenchyma, TTE to assess cardiac function/status , TSH to assess thyroid function    VTE prophylaxis: Heparin 5000 units SQ every 8 hours    BP parameters: Infarct: Post Thrombolytic therapy, SBP <180

## 2024-09-21 NOTE — ASSESSMENT & PLAN NOTE
Patient hypotensive and bradycardic on arrival to C. Prior to IR, patient placed on levo gtt and received sherif bump x 2 doses. In IR, patient received epi (total 7mg), calcium (1g x 2 doses), and bicarb (2 amps) and was placed on levo gtt, epi gtt, and vaso gtt. Patient was transferred to [facility] on levo & epi gtts  - PEA (Pulseless electrical activity)

## 2024-09-21 NOTE — ANESTHESIA PROCEDURE NOTES
Arterial    Diagnosis: Stroke    Patient location during procedure: done in OR  Timeout: 9/21/2024 2:50 AM    Staffing  Authorizing Provider: Phoenix Moscoso MD  Performing Provider: Leonela Zaidi CRNA    Staffing  Performed by: Leonela Zaidi CRNA  Authorized by: Phoenix Moscoso MD    Anesthesiologist was present at the time of the procedure.    Preanesthetic Checklist  Completed: patient identified, IV checked and site markedArterial  Skin Prep: chlorhexidine gluconate  Local Infiltration: none  Orientation: right  Location: radial    Catheter Size: 20 G Insertion Attempts: 1  Assessment  Dressing: secured with tape and tegaderm

## 2024-09-21 NOTE — ACP (ADVANCE CARE PLANNING)
Advance Care Planning     Date: 09/21/2024    Code Status  In light of the patients advanced and life limiting illness,I engaged the the family in a voluntary conversation about the patient's preferences for care at the very end of life. The patient's family had previously decided to make the patient a partial code, allowing for medications, but declining the use of compressions if the patient's heart would stop. The family agreed that the patient wishes to have a natural, peaceful death. In light of her current status given on multiple vasopressors, the family would like to stop all treatment and make the patient a complete DNR. I communicated to the family that a DNR order would be placed in her medical record to reflect this preference.    A total of 15 min was spent on advance care planning, goals of care discussion, emotional support, formulating and communicating prognosis and exploring burden/benefit of various approaches of treatment. This discussion occurred on a fully voluntary basis with the verbal consent of the patient and/or family.     Flor Dave PA-C  Neurocritical Care

## 2024-09-21 NOTE — ASSESSMENT & PLAN NOTE
89 y/o female with a past medical history of hypertension, aortic aneurysm and aortic dissection s/p stent placement presents as a transfer from [facility] to [facility] ED. Patient was admitted to the OSH on 9/19 for generalized weakness. Patient found on 9/20 with R MCA syndrome and telestroke activated. CTH shows hyperdense R MCA and TNK given at 2345 on 9/20. Decision made to transfer patient to [facility] for possible thrombectomy.     During transfer, patient became hypotensive in the 60s and tachycardic. GCS from 14 prior to transfer to 10. Patient was intubated at the helipad at [facility] prior to getting to the ED. CTA obtained but due to IV issues and poor timing, decision made to take the patient to IR for thrombectomy AT 0225. No acute hemorrhage seen on CTH. Patient transported to IR at 0231.      Antithrombotics for secondary stroke prevention: Antiplatelets: Aspirin: 81 mg daily  Clopidogrel: 75 mg daily    Statins for secondary stroke prevention and hyperlipidemia, if present:   Statins: Atorvastatin- 40 mg daily    Aggressive risk factor modification: HTN     Rehab efforts: The patient has been evaluated by a stroke team provider and the therapy needs have been fully considered based off the presenting complaints and exam findings. The following therapy evaluations are needed: PT evaluate and treat, OT evaluate and treat, SLP evaluate and treat    Diagnostics ordered/pending: HgbA1C to assess blood glucose levels, Lipid Profile to assess cholesterol levels, MRI head without contrast to assess brain parenchyma, TTE to assess cardiac function/status , TSH to assess thyroid function    VTE prophylaxis: Heparin 5000 units SQ every 8 hours    BP parameters: Infarct: Post Thrombolytic therapy, SBP <180

## 2024-09-21 NOTE — HPI
"88 y/o female with a past medical history of hypertension, aortic aneurysm and aortic dissection s/p stent placement presents as a transfer from [facility] to [facility] ED. Per chart review, patient presented to the OSH ED "with generalized weakness, inability to stand after a fall, and poor oral intake. Patient reports that yesterday evening around midnight she got up to go use the restroom.  After urinating, she tried returning to her bed but suddenly felt very weak and used a pile of clothes to help her slowly lower herself to the ground.  Patient was unable to stand after lying on the ground and stayed there until a family member came and helped her up. Prior to episode of weakness, patient felt slightly lightheaded and dizzy but denies any shortness of breath, palpitations or chest pain.  Patient denies any lower extremity swelling.  Patient admits that she has had poor oral intake of both food and fluids.  EMS was called and patient was brought to the ER for further evaluation. On arrival to ER, patient in no acute distress.  EKG shows sinus bradycardia with heart rate in the low 40s. Despite bradycardia, patient remains hemodynamically stable with SBP in the 120s.  Patient received IV fluid resuscitation with mild improvement in generalized weakness and lightheadedness. Patient admitted for further evaluation and management of near-syncope, symptomatic bradycardia, and dehydration."    Patient found with a R MCA syndrome and telestroke activated. CTH shows hyperdense R MCA and TNK given sometime after midnight on 9/21. Decision made to transfer patient to [facility] for possible thrombectomy.   "

## 2024-09-21 NOTE — ED NOTES
IV infiltrated in CT scanner, currently this RN attempting to retreive new line, Pt. remained on continuous O2, cardiac, BP, and ETCO2. Pt. remains on 0.05 of levophed

## 2024-09-21 NOTE — SIGNIFICANT EVENT
Patient's family arrived at bedside soon after arrival to unit. Patient remained in PEA with levo and epi infusing. RT at bedside bagging patient. Per family request, no compressions were started. After further discussion with family about patient's critical condition, family consented to making the patient a complete DNR. Code status updated in chart (See ACP Note). Vasopressors stopped and ETT removed per family request. MICU at bedside to declare patient. Time of death called at 0425.     Flor Dave PA-C  Neurocritical Care

## 2024-09-21 NOTE — ED NOTES
This RN placed 20G IV left upper arm, flushed and secured. CTA re attempted. Pt. remained on continuous O2, cardiac, BP, and ETCO2. Pt. remains on 0.05 of levophed

## 2024-09-21 NOTE — ASSESSMENT & PLAN NOTE
Patient noted to be PEA in IR suite and on arrival to [facility]  - Code status discussed with family, patient made partial code (OK for medications, no compressions)  - Continue vasopressors pending further imminent discussion with family

## 2024-09-21 NOTE — ED NOTES
Pt. Placed on CT scanner. Stroke team and Neuro/IR team in CT scanner right now. Pt. remained on continuous O2, cardiac, BP, and ETCO2. Pt. Currently on 0.05 of levophed

## 2024-09-21 NOTE — NURSING
[id number]   Received report from IR nurse LENIN SCHMIDT enroute to [facility]. Per IR nurse, pt is currently on Levo and Epi gtt in PEA. Per nurse, Family does not wish for CPR to be performed, only medical management. Epi pulled and mixed and levo gtt pulled. On arrival to unit, pt is pulseless with no detectable pulse via doppler. Medical management continued until family arrives at bedside.  Decision made by family to withdrawal care at this time.

## 2024-09-21 NOTE — ANESTHESIA PREPROCEDURE EVALUATION
[facility]  Anesthesia Pre-Operative Evaluation   09/21/2024        Lisa Sepulveda, 1935  [id number]  * No surgery found *    Subjective    Lisa Sepulveda is a 89 y.o. female w/ a significant PMHx of HTN, s/p TVAR 2014, admitted to [facility] today after syncopal episode and symptomatic bradycardia, found to have R MCA stroke. Transferred to [facility] for IR.     Patient now presents for above procedure(s).     Level of Care: ED  Hemodynamics: No vasopressor or inotropic support.  Respiratory Status: Room air  IV Access: PIV 22G x2       Prev Airway: None documented.    LDA:        Peripheral IV - Single Lumen 09/20/24 1358 22 G Anterior;Left;Proximal Forearm (Active)   Site Assessment Clean;Dry;No swelling;Intact;No redness;No warmth 09/21/24 0030   Extremity Assessment Distal to IV No abnormal discoloration;No redness;No swelling;No warmth 09/21/24 0030   Line Status Infusing 09/21/24 0030   Dressing Status Clean;Dry;Intact 09/21/24 0030   Dressing Intervention Integrity maintained 09/21/24 0030   Dressing Change Due 09/24/24 09/21/24 0030   Site Change Due 09/24/24 09/21/24 0030   Reason Not Rotated Not due 09/21/24 0030   Number of days: 0            Peripheral IV - Single Lumen 09/20/24 2230 22 G Anterior;Left Hand (Active)   Site Assessment Clean;Dry;No redness;Intact;No swelling;No warmth;No drainage 09/21/24 0030   Extremity Assessment Distal to IV No abnormal discoloration;No redness;No swelling;No warmth 09/21/24 0030   Line Status Saline locked 09/21/24 0030   Dressing Status Clean;Dry;Intact 09/21/24 0030   Dressing Intervention First dressing 09/21/24 0030   Dressing Change Due 09/24/24 09/21/24 0030   Site Change Due 09/24/24 09/21/24 0030   Reason Not Rotated Not due 09/21/24 0030   Number of days: 0            Peripheral IV - Single Lumen 09/21/24 0100 20 G Anterior;Proximal;Right Forearm (Active)   Number of days: 0       Drips: None documented.      Patient  Active Problem List   Diagnosis    Ruptured aneurysm of thoracic aorta    Elevated troponin    Hypertension complications    LVH (left ventricular hypertrophy)    Dissecting aortic aneurysm (any part), thoracoabdominal    IUD mechanical complication    Vaginitis    Aortic dissection    Functional quadriplegia    Fever    Oliguria    Hypertensive heart disease without heart failure    Shortness of breath    Essential hypertension    Hx of repair of dissecting thoracic aneurysm    Near syncope    Dissection of right subclavian artery    Closed displaced fracture of proximal phalanx of left index finger with routine healing    History of aortic dissection    Generalized weakness    Symptomatic bradycardia    Acute ischemic right MCA stroke       Review of patient's allergies indicates:  No Known Allergies    Current Inpatient Medications:       Current Outpatient Medications on File Prior to Encounter   Medication Sig Dispense Refill    albuterol (PROVENTIL/VENTOLIN HFA) 90 mcg/actuation inhaler Inhale 1-2 puffs into the lungs every 6 (six) hours as needed for Wheezing. Rescue 18 g 11    alendronate (FOSAMAX) 70 MG tablet Take 70 mg by mouth every 7 days.      aspirin 81 MG Chew Take 81 mg by mouth once daily.      EScitalopram oxalate (LEXAPRO) 10 MG tablet Take 10 mg by mouth once daily.      folic acid/multivit-min/lutein (CENTRUM SILVER ORAL) Take by mouth.      labetalol (NORMODYNE) 100 MG tablet Take 100 mg by mouth. Take 1/2 tablet twice daily      NIFEdipine (PROCARDIA-XL) 60 MG (OSM) 24 hr tablet TAKE ONE TABLET BY MOUTH ONCE DAILY (Patient taking differently: Take 60 mg by mouth once daily.) 90 tablet 3    rosuvastatin (CRESTOR) 20 MG tablet Take 1 tablet (20 mg total) by mouth every evening. 90 tablet 3     No current facility-administered medications on file prior to encounter.       Past Surgical History:   Procedure Laterality Date    ABDOMINAL SURGERY      THORACIC AORTIC ANEURYSM REPAIR         Social  History:  Tobacco Use: Low Risk  (9/20/2024)    Patient History     Smoking Tobacco Use: Never     Smokeless Tobacco Use: Never     Passive Exposure: Not on file       Alcohol Use: Not on file       Objective    Vital Signs Range:  BMI Readings from Last 1 Encounters:   09/20/24 26.79 kg/m²       Temp:  [36.7 °C (98 °F)-36.8 °C (98.2 °F)]   Pulse:  [43-86]   Resp:  [18-38]   BP: ()/(51-72)   SpO2:  [95 %-100 %]        Significant Labs:        Component Value Date/Time    WBC 4.52 09/20/2024 1359    HGB 12.6 09/20/2024 1359    HCT 38.1 09/20/2024 1359     09/20/2024 1359     09/20/2024 1359    K 4.3 09/20/2024 1359     09/20/2024 1359    CO2 26 09/20/2024 1359     09/20/2024 1359    BUN 10 09/20/2024 1359    CREATININE 0.9 09/20/2024 1359    MG 1.7 03/17/2014 0453    PHOS 4.6 (H) 03/17/2014 0453    CALCIUM 9.3 09/20/2024 1359    ALBUMIN 3.5 09/20/2024 1359    PROT 6.8 09/20/2024 1359    ALKPHOS 42 (L) 09/20/2024 1359    BILITOT 0.5 09/20/2024 1359    AST 27 09/20/2024 1359    ALT 13 09/20/2024 1359    INR 1.1 05/30/2018 1800        Please see Results Review for additional labs.     Diagnostic Studies: All relevant studies, reviewed.      EKG:   Results for orders placed or performed during the hospital encounter of 09/20/24   EKG 12-lead    Collection Time: 09/20/24  2:02 PM   Result Value Ref Range    QRS Duration 78 ms    OHS QTC Calculation 463 ms    Narrative    Test Reason : R55,    Vent. Rate : 047 BPM     Atrial Rate : 047 BPM     P-R Int : 178 ms          QRS Dur : 078 ms      QT Int : 524 ms       P-R-T Axes : 073 008 258 degrees     QTc Int : 463 ms    Sinus bradycardia  LVH with secondary ST-T wave changes  Abnormal ECG    Confirmed by Estefanía Cui MD, Jef ([id number]) on 9/20/2024 3:58:24 PM    Referred By: GIL LANTIGUA           Confirmed By:Jef Cui,       ECHO:  Results for orders placed in visit on 12/10/20    Echo Color Flow Doppler? Yes    Interpretation  Summary  · The left ventricle is normal in size with concentric remodeling and normal systolic function. The estimated ejection fraction is 70%  · Grade I left ventricular diastolic dysfunction.  · Mild left atrial enlargement.  · Normal right ventricular size with normal right ventricular systolic function.  · Mild mitral regurgitation.  · Normal central venous pressure (3 mmHg).  · The estimated PA systolic pressure is 17 mmHg.            Pre-op Assessment    I have reviewed the Patient Summary Reports.     I have reviewed the Nursing Notes.    I have reviewed the Medications.     Review of Systems  Cardiovascular:     Hypertension               Hx of thoracic aortic aneurysm s/p TVAR 2014                         Neurological:   CVA                                        Physical Exam  General: Unconscious  intubated  Airway:  Mallampati: unable to assess   Pre-Existing Airway: Oral Endotracheal tube        Anesthesia Plan  Type of Anesthesia, risks & benefits discussed:    Anesthesia Type: Gen ETT  Intra-op Monitoring Plan: Standard ASA Monitors  Post Op Pain Control Plan: multimodal analgesia and IV/PO Opioids PRN  Induction:  rapid sequence  Airway Plan: Video, Post-Induction  Informed Consent: Informed consent signed with the Patient representative and all parties understand the risks and agree with anesthesia plan.  All questions answered.   ASA Score: 4 Emergent  Day of Surgery Review of History & Physical: H&P Update referred to the surgeon/provider.  Anesthesia Plan Notes: Intubated upon arrival. Anesthesia consent completed with patient's daughters Iwona and Audra via phone.     Ready For Surgery From Anesthesia Perspective.     .

## 2024-09-21 NOTE — SIGNIFICANT EVENT
Death Note    Called to bedside by patient's nurse. Nursing supervisor notified. Family at bedside.    Patient is not responding to verbal or tactile stimuli. Patient does not have a papillary or corneal reflex. Pupils are fixed and dilated. No heart or breath sounds on auscultation. No respirations. No palpable pulses.     Time of death:  09/21/2024 0425hrs     Cause of Death:  Cardiac Arrest in the setting of CVA      Email: [email or web address]

## 2024-09-21 NOTE — ED PROVIDER NOTES
Encounter Date: 9/21/2024       History     Chief Complaint   Patient presents with    Transfer      From [facility], TNK @ 0028. Pt marlon and hypotensive en route, intubated by flight crew. On .05 levo     89-year-old female with a known past medical history of hypertension and history of aortic aneurysm and aortic dissection s/p stent placement that presents to [facility] via flight med from [place] where she was found to have right MCA CVA. Pt received TNK at 0028. Patient became bradycardic and hypotensive en route, was intubated here in helicopter pad for airway protection. Arrives intubated on 0.05 levophed.         The history is provided by medical records and the EMS personnel. The history is limited by the condition of the patient.     Review of patient's allergies indicates:  No Known Allergies  Past Medical History:   Diagnosis Date    Aortic aneurysm     Arthritis     Hypertension      Past Surgical History:   Procedure Laterality Date    ABDOMINAL SURGERY      THORACIC AORTIC ANEURYSM REPAIR       Family History   Problem Relation Name Age of Onset    Hypertension Father      Stroke Father      Diabetes Sister      Diabetes Sister       Social History     Tobacco Use    Smoking status: Never    Smokeless tobacco: Never   Substance Use Topics    Alcohol use: No    Drug use: No     Review of Systems    Physical Exam     Initial Vitals [09/21/24 0207]   BP Pulse Resp Temp SpO2   (!) 85/39 (!) 120 20 98 °F (36.7 °C) 98 %      MAP       --         Physical Exam    Constitutional: She appears distressed. She is intubated.   HENT:   ETT in place    Eyes: Pupils are equal, round, and reactive to light. No scleral icterus.   Neck:   Intubated    Cardiovascular:  Regular rhythm.   Tachycardia present.         Pulmonary/Chest: She is intubated.   Intubated    Abdominal: Abdomen is soft. She exhibits no distension.     Neurological:   Unresponsive    Skin: Skin is warm and dry.         ED Course   Procedures  Labs  Reviewed   HIV 1 / 2 ANTIBODY   HEPATITIS C ANTIBODY   CBC W/ AUTO DIFFERENTIAL   COMPREHENSIVE METABOLIC PANEL   PROTIME-INR   TYPE & SCREEN          Imaging Results               CTA STROKE MULTI-PHASE (Final result)  Result time 09/21/24 04:58:26      Final result by Trung Byrd MD (09/21/24 04:58:26)                   Impression:      Abnormal findings in the partially visualized thoracic aorta and mediastinum, suspicious for ascending aortic rupture.    Angiographic images are nondiagnostic, especially for cerebrovascular assessment, secondary to contrast bolus timing.    Possible acute evolving right MCA infarct.  No hyperattenuating acute intracranial hemorrhage.    Generalized cerebral volume loss with sequela of chronic microvascular ischemic change.    Stable scattered remote infarcts, as detailed in the body of the report.    This report was flagged in Epic as abnormal.    Trung Byrd MD, first observed the critical findings on 09/21/2024 at 04:32, and immediately phoned the radiology resident on-call, Can Cardenas MD, to notify him of the critical findings and to have him alert the appropriate providers.  Can Cardenas MD, notified JOSE Barnhart, via telephone on 09/21/2024 at 04:43. Patient name and medical record number were specified comment read back was performed, in these telephone communications.        Electronically signed by resident: Can Cardenas  Date:    09/21/2024  Time:    02:46    Electronically signed by: Trung Byrd  Date:    09/21/2024  Time:    04:58               Narrative:    EXAMINATION:  CTA STROKE MULTI-PHASE    CLINICAL HISTORY:  Stroke, follow up;    TECHNIQUE:  Non contrast low dose axial images were obtained thought the head. CT angiogram was performed from the level of the mili to the top of the head following the IV administration of 75mL of Omnipaque 350.   Sagittal and coronal reconstructions and maximum intensity projection  reconstructions were performed. Arterial stenosis percentages are based on NASCET measurement criteria.  Two additional phases of immediate post-contrast CTA images were performed through the head alone.    Note the contrast bolus timing was inadequate to obtain a diagnostic study.    COMPARISON:  CT 09/20/2024    FINDINGS:  Intracranial Compartment:    Prominence of the ventricles and sulci compatible with generalized cerebral volume loss.  No hydrocephalus. No extra-axial blood or fluid collections.    The hyperdense right MCA sign characterized on CT performed 09/20/2024 is no longer identified.    However, allowing for some artifacts, there is slightly decreased gray-white differentiation in portions of the right cerebral hemisphere, corresponding to much of the distribution of the right MCA.  This is suspicious for right MCA ischemia or early infarction.    Confluent hypoattenuation in the supratentorial white matter, nonspecific but most likely reflecting chronic microvascular ischemic changes.  Encephalomalacia in the left frontal lobe compatible with remote infarct, stable.  Remote lacunar infarcts in the bilateral basal ganglia and thalami.  No parenchymal mass, hemorrhage or edema.    Skull/Extracranial Contents (limited evaluation): No fracture.  Degenerative changes of the spine.  Mastoid air cells and paranasal sinuses are essentially clear.    Non-Vascular Structures of the Neck/Thoracic Inlet (limited evaluation):    Few foci of subcutaneous gas in the lower left neck and bilateral subclavicular regions.    CTA:    Angiographic sequences of the head and neck are nondiagnostic secondary to absent or very poor arterial enhancement.    In the visualized upper chest, there is a partially visualized stent or stent graft in the aortic arch and descending aorta.  The margins of the ascending aorta are indistinct and there is some adjacent high attenuation (46 HU) pericardial fluid.  Some mediastinal hematoma  is also suggested adjacent to the aorta.                                       Medications   EPINEPHrine (PF) (ADRENALIN) 1 mg/mL (1 mL) injection (has no administration in time range)   NORepinephrine bitartrate-D5W 4 mg/250 mL (16 mcg/mL) infusion Soln (has no administration in time range)   iohexoL (OMNIPAQUE 350) injection 75 mL (75 mLs Intravenous Given 9/21/24 0235)   sodium chloride 0.9% bolus 500 mL 500 mL (0 mLs Intravenous Stopped 9/21/24 0235)   PHENYLephrine HCl in 0.9% NaCl 1 mg/10 mL (100 mcg/mL) syringe 100 mcg (1 mg Intravenous Given 9/21/24 0239)     Medical Decision Making  88 y/o F arrives for IR evaluation and intervention for right MCA CVA.     On arrival, patient is ill appearing, intubated, unresponsive. Taken immediately for CTA multiphase and discussed with IR who came to bedside and evaluated. Anesthesia at bedside for emergent consent. Patient taken for thrombectomy. Imaging pending upon admission.     Amount and/or Complexity of Data Reviewed  Labs: ordered.  Radiology: ordered.    Risk  Prescription drug management.  Decision regarding hospitalization.  Decision not to resuscitate or to de-escalate care because of poor prognosis.                                      Clinical Impression:  Final diagnoses:  [I63.9] Cerebrovascular accident (CVA), unspecified mechanism          ED Disposition Condition    Admit Stable                Kay Irvin MD  Resident  09/21/24 8401

## 2024-09-21 NOTE — ED NOTES
Patient transported upstairs with respiratory, Neuro/IR, anesthesia, this nurse, and [facility] to IR, pt. Remained on continuous O2, cardiac, BP, and ETCO2. Pt. remains on 0.05 of levophed

## 2024-09-21 NOTE — SIGNIFICANT EVENT
Patient in refractory hypotension / shock - SBPs < 40 / with negligible pulsality - needing incremental dosing of multiple vasopressors. Undifferentiated etiology at the moment. Critical concerns for progression to cardio respiratory arrest / PEA.     Ongoing active management by anesthesiology team.     Holding off on active neuro intervention / defer given the hemodynamic instability - at the background of non modifiable and modifiable risk factor variables defining overall prognosis     Discussed in-detail related to the above / with the anesthesiology expertise. Family in understanding of the prognosis and imminent mortality risk / plans to attempt manage medically - without any active chest compressions.     Plans to transfer to [facility] for further monitoring             Diamante Bolton MD    General Neurology, [facility],   [address] [facility] [address], [address], [place] [address]    Vascular Neurology, Endovascular Neurosurgery,   [facility], [facility] [place] [address]

## 2024-09-22 NOTE — RESPIRATORY THERAPY
Pt arrived to unit from IR with RN and CRNA in PEA. This RT began to bag patient.Decision made by family to withdraws care. Pt extubated w/o parameters to room air per PA order.

## 2024-09-22 NOTE — HOSPITAL COURSE
Patient's family arrived at bedside soon after arrival to unit. Patient remained in PEA with levo and epi infusing. RT at bedside bagging patient. Per family request, no compressions were started. After further discussion with family about patient's critical condition, family consented to making the patient a complete DNR. Code status updated in chart (See ACP Note). Vasopressors stopped and ETT removed per family request. MICU came to bedside to declare patient. Time of death called at 0425.

## 2024-09-22 NOTE — ASSESSMENT & PLAN NOTE
Patient noted to be PEA in IR suite and on arrival to [facility]  - Code status discussed with family, patient made partial code (OK for medications, no compressions)  - Vasopressors and bag ventilation continued until discussion with family  - On arrival to unit, family decided to make patient full DNR and requested that all care be stopped at that time and ETT be removed.   - Time of death 0425.

## 2024-09-22 NOTE — ASSESSMENT & PLAN NOTE
89 y.o. female with history of HTN and thoracic aortic aneurysm and dissection s/p repair with stent placement (03/2014) transferred from [facility] for evaluation of R MCA CVA s/p TNK (0028). NIHSS at OSH 18. Patient exam declined while in route to [facility] resulting in her being placed on levo gtt and being intubated by flight crew. CTA obtained in the ED and patient taken for thrombectomy, but procedure not attempted due to hemodynamic instability on arrival to IR suite. Code status discussed with family and patient made partial code (okay for medications, no compressions). Patient received multiple sherif bumps, epinephrine, calcium, and bicarb in addition to being placed on levo gtt, epi gtt, and vaso gtt. Patient transferred to [facility] and noted to be unresponsive and in PEA on arrival.   - Admit to [facility]  - Initial CTH shows hyperdense R MCA.    - CTA non-diagnostic due to contrast bolus timing but showed possible acute evolving right MCA infarct. Also noted were abnormal findings in the partially visualized thoracic aorta and mediastinum suspicious for ascending aortic rupture. Results conveyed by radiology after time of death.   - Vascular Neurology following   - q1 neuro checks, vitals, I/Os  - Unable to complete majority of stroke work up due to condition on arrival to unit and decision to make patient DNR and stop all further care.   - Vasopressors continued to maintain MAP > 65 until family arrived in unit, at which time the patient was made full DNR after further discussion. Vasopressors were stopped and ETT was removed per family request.    - Time of Death 0425

## 2024-09-22 NOTE — DISCHARGE SUMMARY
"[facility]  Neurocritical Care  Discharge Summary    Admit Date: 9/21/2024    Service Date: 09/21/2024    Discharge Date: 9/21/2024    Length of Stay: 1    Final Active Diagnoses:    Diagnosis Date Noted POA    PRINCIPAL PROBLEM:  Acute ischemic right MCA stroke [I63.511] 09/21/2024 Yes    Hypotension [I95.9] 09/21/2024 Yes    PEA (Pulseless electrical activity) [I46.9] 09/21/2024 Yes    Hx of repair of dissecting thoracic aneurysm [Z98.890, Z86.79] 11/06/2017 Not Applicable     Chronic      Problems Resolved During this Admission:      History of Present Illness: Lisa Sepulveda is a 89 y.o. 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 for evaluation of R MCA CVA s/p TNK (0028). Per chart review, the patient went to use the restroom at approximately 0000, and when attempting to ambulate back to bed, she felt weak and had to lower herself to the floor. She was unable to stand after lying on the floor and remained there until a family member came to help her up. EMS was activated to the ED, where she was found to be sinus bradycardia (HR 40s) but hemodynamically stable with SBP in the 120s. The patient admitted that she felt lightheaded and dizzy prior to the episode and stated that she had poor oral intake of food and liquids recently. She received IV fluids in the ED with improvement in her symptoms (GCS14) and was admitted to the hospital for evaluation of symptomatic bradycardia, presyncope, and dehydration.     At approximately 2230, the patient reportedly had an acute neuro change and was evaluated by eICU. She was noted to be "agitated and diffusely shaking" with "no  strength on the left and left arm now contracted," "right facial droop," and "can only mumble now." BP was noted to be 74/57 with HR 85. Her shaking resolved with 0.5mg Ativan IV x 1, and she was sent for STAT " CTH, which showed a hyperdense R MCA. She was evaluated via TeleStroke and found to be NIHSS 18. The decision was made to give TNK (end 0028) and transfer the patient [place] [facility] for possible [place]ectomy. While in route to [facility], she became hypotensive and bradycardic, and she was intubated for airway protection by the flight crew.     On arrival to [facility], the patient was placed on 0.05 levo gtt and was given 100mcg sherif bump prior to being taken for STAT CTA. CTA was delayed given issues with contrast administration. The decision was made to take the patient to IR, and while in route to IR, the patient required an additional sherif bump due to hypotension. On arrival to the IR suite, the patient was found to have SBP in the 60's in spite of levo gtt and sherif bumps. Angiogram and thrombectomy were deferred given patient's instability. Both neuroIR and anesthesia MDs spoke with family, who confirmed that the patient was a partial code (no compressions, medication only) in the event that her heart stopped. During her time in IR, she received a total of 7mg epi,  calcium 1g x 2, bicarb amp x 2, and became maxed on levo gtt, epi gtt, and vaso gtt. The patient remained hemodynamically unstable and was transferred to the ICU. On arrival to the ICU, the patient was noted to be in PEA. The patient will be admitted to [facility] for close monitoring and a higher level of care.     Hospital Course by Event: Patient's family arrived at bedside soon after arrival to unit. Patient remained in PEA with levo and epi infusing. RT at bedside bagging patient. Per family request, no compressions were started. After further discussion with family about patient's critical condition, family consented to making the patient a complete DNR. Code status updated in chart (See ACP Note). Vasopressors stopped and ETT removed per family request. MICU came to bedside to declare patient. Time of death called at 0425.     Hospital Course by Problem:   * Acute  ischemic right MCA stroke  89 y.o. female with history of HTN and thoracic aortic aneurysm and dissection s/p repair with stent placement (03/2014) transferred from [facility] for evaluation of R MCA CVA s/p TNK (0028). NIHSS at OSH 18. Patient exam declined while in route [place] [facility] [place] in [place] being placed on levo gtt and being intubated by flight crew. CTA obtained in the ED and patient taken for thrombectomy, but procedure not attempted due to hemodynamic instability on arrival to IR suite. Code status discussed with family and patient made partial code (okay for medications, no compressions). Patient received multiple sherif bumps, epinephrine, calcium, and bicarb in addition to being placed on levo gtt, epi gtt, and vaso gtt. Patient transferred to [facility] and noted to be unresponsive and in PEA on arrival.   - Admit to [facility]  - Initial CTH shows hyperdense R MCA.    - CTA non-diagnostic due to contrast bolus timing but showed possible acute evolving right MCA infarct. Also noted were abnormal findings in the partially visualized thoracic aorta and mediastinum suspicious for ascending aortic rupture. Results conveyed by radiology after time of death.   - Vascular Neurology following   - q1 neuro checks, vitals, I/Os  - Unable to complete majority of stroke work up due to condition on arrival to unit and decision to make patient DNR and stop all further care.   - Vasopressors continued to maintain MAP > 65 until family arrived in unit, at which time the patient was made full DNR after further discussion. Vasopressors were stopped and ETT was removed per family request.    - Time of Death 0425    PEA (Pulseless electrical activity)  Patient noted to be PEA in IR suite and on arrival to [facility]  - Code status discussed with family, patient made partial code (OK for medications, no compressions)  - Vasopressors and bag ventilation continued until discussion with family  - On arrival to unit, family  decided to make patient full DNR and requested that all care be stopped at that time and ETT be removed.   - Time of death 0425.     Hypotension  Patient hypotensive and bradycardic on arrival to [facility]. Prior to IR, patient placed on levo gtt and received sherif bump x 2 doses. In IR, patient received epi (total 7mg), calcium (1g x 2 doses), and bicarb (2 amps) and was placed on levo gtt, epi gtt, and vaso gtt. Patient was transferred to [facility] on levo & epi gtts  - PEA (Pulseless electrical activity)    Hx of repair of dissecting thoracic aneurysm  History of, s/p repair in 2014        Goals of Care Treatment Preferences:  Code Status: DNR      Significant Results:  Imaging:  CTA Stroke MP:  Impression:  Abnormal findings in the partially visualized thoracic aorta and mediastinum, suspicious for ascending aortic rupture.  Angiographic images are nondiagnostic, especially for cerebrovascular assessment, secondary to contrast bolus timing.  Possible acute evolving right MCA infarct.  No hyperattenuating acute intracranial hemorrhage.  Generalized cerebral volume loss with sequela of chronic microvascular ischemic change.  Stable scattered remote infarcts, as detailed in the body of the report.  This report was flagged in Epic as abnormal.  Trnug Byrd MD, first observed the critical findings on 09/21/2024 at 04:32, and immediately phoned the radiology resident on-call, Can Cardenas MD, to notify him of the critical findings and to have him alert the appropriate providers.  Can Cardenas MD, notified JOSE Barnhart, via telephone on 09/21/2024 at 04:43. Patient name and medical record number were specified comment read back was performed, in these telephone communications.  Electronically signed by resident: Can Cardenas  Date:                                            09/21/2024  Time:                                           02:46  Electronically signed by:Trung Byrd  Date:                                             2024  Time:                                           04:58    CT Head Without Contrast:  Impression:  Hyperdense right MCA, compatible with an acute thrombus.  Further evaluation with CTA head and neck and/or MRI brain as clinically indicated.  Chronic microvascular ischemic changes and multiple remote infarcts as above.  This report was flagged in Epic as abnormal.  Dr. Overton discussed critical findings with Dr. Sanchez by [facility] secure chat at 11:59 on 2024.   Electronically signed by:Bill Overton  Date:                                            2024  Time:                                           00:02      Cardiology:  EKG    Laboratory:  Lab Results   Component Value Date    CHOL 135 2023    HDL 57 2023    LDLCALC 61.0 (L) 2023    TRIG 85 2023    TSH 2.020 2023       Disposition: Discharged as .    This discharge took more than 30 minutes to complete.    Flor Dave PA-C  Neurocritical Care  [facility]
